# Patient Record
Sex: MALE | Race: WHITE | NOT HISPANIC OR LATINO | URBAN - METROPOLITAN AREA
[De-identification: names, ages, dates, MRNs, and addresses within clinical notes are randomized per-mention and may not be internally consistent; named-entity substitution may affect disease eponyms.]

---

## 2018-01-30 ENCOUNTER — OUTPATIENT (OUTPATIENT)
Dept: OUTPATIENT SERVICES | Facility: HOSPITAL | Age: 68
LOS: 1 days | End: 2018-01-30
Payer: COMMERCIAL

## 2018-01-30 VITALS
HEART RATE: 59 BPM | DIASTOLIC BLOOD PRESSURE: 82 MMHG | WEIGHT: 199.96 LBS | HEIGHT: 66 IN | RESPIRATION RATE: 14 BRPM | SYSTOLIC BLOOD PRESSURE: 136 MMHG | TEMPERATURE: 98 F

## 2018-01-30 DIAGNOSIS — M17.12 UNILATERAL PRIMARY OSTEOARTHRITIS, LEFT KNEE: ICD-10-CM

## 2018-01-30 DIAGNOSIS — Z98.890 OTHER SPECIFIED POSTPROCEDURAL STATES: Chronic | ICD-10-CM

## 2018-01-30 DIAGNOSIS — Z01.818 ENCOUNTER FOR OTHER PREPROCEDURAL EXAMINATION: ICD-10-CM

## 2018-01-30 LAB
ALBUMIN SERPL ELPH-MCNC: 4 G/DL — SIGNIFICANT CHANGE UP (ref 3.3–5)
ALP SERPL-CCNC: 78 U/L — SIGNIFICANT CHANGE UP (ref 40–120)
ALT FLD-CCNC: 45 U/L — SIGNIFICANT CHANGE UP (ref 12–78)
ANION GAP SERPL CALC-SCNC: 5 MMOL/L — SIGNIFICANT CHANGE UP (ref 5–17)
APPEARANCE UR: CLEAR — SIGNIFICANT CHANGE UP
APTT BLD: 28.9 SEC — SIGNIFICANT CHANGE UP (ref 27.5–37.4)
AST SERPL-CCNC: 33 U/L — SIGNIFICANT CHANGE UP (ref 15–37)
BILIRUB SERPL-MCNC: 1.2 MG/DL — SIGNIFICANT CHANGE UP (ref 0.2–1.2)
BILIRUB UR-MCNC: NEGATIVE — SIGNIFICANT CHANGE UP
BUN SERPL-MCNC: 14 MG/DL — SIGNIFICANT CHANGE UP (ref 7–23)
CALCIUM SERPL-MCNC: 9.2 MG/DL — SIGNIFICANT CHANGE UP (ref 8.5–10.1)
CHLORIDE SERPL-SCNC: 106 MMOL/L — SIGNIFICANT CHANGE UP (ref 96–108)
CO2 SERPL-SCNC: 29 MMOL/L — SIGNIFICANT CHANGE UP (ref 22–31)
COLOR SPEC: YELLOW — SIGNIFICANT CHANGE UP
CREAT SERPL-MCNC: 1.4 MG/DL — HIGH (ref 0.5–1.3)
DIFF PNL FLD: NEGATIVE — SIGNIFICANT CHANGE UP
GLUCOSE SERPL-MCNC: 93 MG/DL — SIGNIFICANT CHANGE UP (ref 70–99)
GLUCOSE UR QL: NEGATIVE — SIGNIFICANT CHANGE UP
HCT VFR BLD CALC: 41.8 % — SIGNIFICANT CHANGE UP (ref 39–50)
HCV AB S/CO SERPL IA: 0.05 S/CO — SIGNIFICANT CHANGE UP
HCV AB SERPL-IMP: SIGNIFICANT CHANGE UP
HGB BLD-MCNC: 13.9 G/DL — SIGNIFICANT CHANGE UP (ref 13–17)
HIV 1+2 AB+HIV1 P24 AG SERPL QL IA: SIGNIFICANT CHANGE UP
INR BLD: 1 RATIO — SIGNIFICANT CHANGE UP (ref 0.88–1.16)
KETONES UR-MCNC: NEGATIVE — SIGNIFICANT CHANGE UP
LEUKOCYTE ESTERASE UR-ACNC: NEGATIVE — SIGNIFICANT CHANGE UP
MCHC RBC-ENTMCNC: 31.9 PG — SIGNIFICANT CHANGE UP (ref 27–34)
MCHC RBC-ENTMCNC: 33.3 GM/DL — SIGNIFICANT CHANGE UP (ref 32–36)
MCV RBC AUTO: 95.8 FL — SIGNIFICANT CHANGE UP (ref 80–100)
NITRITE UR-MCNC: NEGATIVE — SIGNIFICANT CHANGE UP
PH UR: 5 — SIGNIFICANT CHANGE UP (ref 5–8)
PLATELET # BLD AUTO: 297 K/UL — SIGNIFICANT CHANGE UP (ref 150–400)
POTASSIUM SERPL-MCNC: 4.1 MMOL/L — SIGNIFICANT CHANGE UP (ref 3.5–5.3)
POTASSIUM SERPL-SCNC: 4.1 MMOL/L — SIGNIFICANT CHANGE UP (ref 3.5–5.3)
PROT SERPL-MCNC: 7 G/DL — SIGNIFICANT CHANGE UP (ref 6–8.3)
PROT UR-MCNC: NEGATIVE — SIGNIFICANT CHANGE UP
PROTHROM AB SERPL-ACNC: 10.9 SEC — SIGNIFICANT CHANGE UP (ref 9.8–12.7)
RBC # BLD: 4.36 M/UL — SIGNIFICANT CHANGE UP (ref 4.2–5.8)
RBC # FLD: 14.5 % — SIGNIFICANT CHANGE UP (ref 10.3–14.5)
SODIUM SERPL-SCNC: 140 MMOL/L — SIGNIFICANT CHANGE UP (ref 135–145)
SP GR SPEC: 1.02 — SIGNIFICANT CHANGE UP (ref 1.01–1.02)
UROBILINOGEN FLD QL: NEGATIVE — SIGNIFICANT CHANGE UP
WBC # BLD: 5.6 K/UL — SIGNIFICANT CHANGE UP (ref 3.8–10.5)
WBC # FLD AUTO: 5.6 K/UL — SIGNIFICANT CHANGE UP (ref 3.8–10.5)

## 2018-01-30 PROCEDURE — 71046 X-RAY EXAM CHEST 2 VIEWS: CPT | Mod: 26

## 2018-01-30 PROCEDURE — 80053 COMPREHEN METABOLIC PANEL: CPT

## 2018-01-30 PROCEDURE — 93010 ELECTROCARDIOGRAM REPORT: CPT | Mod: NC

## 2018-01-30 PROCEDURE — 87640 STAPH A DNA AMP PROBE: CPT

## 2018-01-30 PROCEDURE — 86803 HEPATITIS C AB TEST: CPT

## 2018-01-30 PROCEDURE — 93005 ELECTROCARDIOGRAM TRACING: CPT

## 2018-01-30 PROCEDURE — 81003 URINALYSIS AUTO W/O SCOPE: CPT

## 2018-01-30 PROCEDURE — 86850 RBC ANTIBODY SCREEN: CPT

## 2018-01-30 PROCEDURE — 87389 HIV-1 AG W/HIV-1&-2 AB AG IA: CPT

## 2018-01-30 PROCEDURE — 71046 X-RAY EXAM CHEST 2 VIEWS: CPT

## 2018-01-30 PROCEDURE — 85027 COMPLETE CBC AUTOMATED: CPT

## 2018-01-30 PROCEDURE — 86900 BLOOD TYPING SEROLOGIC ABO: CPT

## 2018-01-30 PROCEDURE — 87641 MR-STAPH DNA AMP PROBE: CPT

## 2018-01-30 PROCEDURE — 86901 BLOOD TYPING SEROLOGIC RH(D): CPT

## 2018-01-30 PROCEDURE — 85730 THROMBOPLASTIN TIME PARTIAL: CPT

## 2018-01-30 PROCEDURE — 87086 URINE CULTURE/COLONY COUNT: CPT

## 2018-01-30 PROCEDURE — 85610 PROTHROMBIN TIME: CPT

## 2018-01-30 PROCEDURE — G0463: CPT

## 2018-01-30 NOTE — H&P PST ADULT - HISTORY OF PRESENT ILLNESS
67 year old male presents for PST prior to LEFT Total Knee Replacement with Dr Unruly Figueroa on 2/13/18 - pt notes his LEFT Knee has been bothering him for the past 2 years now (notes prior knee arthroscopy left knee back in 1984) - pt notes knee was good until last few years when he started developing left knee pain - has not had any Physical Therapy - has had one Cortisone injection with little relief noted - noted decreased ROM and strength - also notes had series of Gel Injections (X5) - pt notes xrays show bone on bone rates pain #8/10 on pain scale - Uses Aleve as needed for pain -   following discussions with Dr Figueroa pt is electing for scheduled procedure.

## 2018-01-30 NOTE — H&P PST ADULT - PMH
Afib  S/P Ablation in 2013  BPH (benign prostatic hypertrophy)    Dyslipidemia    Hypertension    Unilateral primary osteoarthritis, left knee Afib  S/P Ablation in 2013  BPH (benign prostatic hypertrophy)    Dyslipidemia    Hypertension    Migraine    Unilateral primary osteoarthritis, left knee

## 2018-01-30 NOTE — H&P PST ADULT - PSH
Elbow joint pain  ligament repair  H/O colonoscopy  Age 52  History of arthroscopy of knee  left  Trigger finger  repair left hand  Ventral hernia  repair with mesh

## 2018-01-30 NOTE — H&P PST ADULT - ASSESSMENT
67 year old male with Unilateral Primary Osteoarthritis; LEFT KNEE - scheduled for LEFT Total Knee Replacement with Dr Unruly Figueroa on 2/13/18 -

## 2018-01-30 NOTE — H&P PST ADULT - MUSCULOSKELETAL
details… detailed exam decreased ROM/decreased ROM due to pain/LEFT Knee Pain - decreased ROM/Strength/no calf tenderness/diminished strength

## 2018-01-30 NOTE — H&P PST ADULT - NSANTHOSAYNRD_GEN_A_CORE
No. EULALIA screening performed.  STOP BANG Legend: 0-2 = LOW Risk; 3-4 = INTERMEDIATE Risk; 5-8 = HIGH Risk

## 2018-01-30 NOTE — H&P PST ADULT - FAMILY HISTORY
Mother  Still living? No  Family history of cancer, Age at diagnosis: Age Unknown     Father  Still living? No  Family history of lung cancer, Age at diagnosis: Age Unknown     Sibling  Still living? No  Family history of pancreatic cancer, Age at diagnosis: Age Unknown

## 2018-01-31 LAB
CULTURE RESULTS: NO GROWTH — SIGNIFICANT CHANGE UP
MRSA PCR RESULT.: SIGNIFICANT CHANGE UP
S AUREUS DNA NOSE QL NAA+PROBE: SIGNIFICANT CHANGE UP
SPECIMEN SOURCE: SIGNIFICANT CHANGE UP

## 2018-02-12 RX ORDER — SODIUM CHLORIDE 9 MG/ML
1000 INJECTION, SOLUTION INTRAVENOUS
Qty: 0 | Refills: 0 | Status: DISCONTINUED | OUTPATIENT
Start: 2018-02-13 | End: 2018-02-13

## 2018-02-13 ENCOUNTER — TRANSCRIPTION ENCOUNTER (OUTPATIENT)
Age: 68
End: 2018-02-13

## 2018-02-13 ENCOUNTER — INPATIENT (INPATIENT)
Facility: HOSPITAL | Age: 68
LOS: 2 days | Discharge: HOME | DRG: 470 | End: 2018-02-16
Attending: ORTHOPAEDIC SURGERY | Admitting: ORTHOPAEDIC SURGERY
Payer: COMMERCIAL

## 2018-02-13 ENCOUNTER — RESULT REVIEW (OUTPATIENT)
Age: 68
End: 2018-02-13

## 2018-02-13 VITALS
DIASTOLIC BLOOD PRESSURE: 92 MMHG | OXYGEN SATURATION: 97 % | SYSTOLIC BLOOD PRESSURE: 151 MMHG | HEART RATE: 67 BPM | RESPIRATION RATE: 16 BRPM | TEMPERATURE: 99 F

## 2018-02-13 DIAGNOSIS — M17.12 UNILATERAL PRIMARY OSTEOARTHRITIS, LEFT KNEE: ICD-10-CM

## 2018-02-13 DIAGNOSIS — Z29.9 ENCOUNTER FOR PROPHYLACTIC MEASURES, UNSPECIFIED: ICD-10-CM

## 2018-02-13 DIAGNOSIS — Z98.890 OTHER SPECIFIED POSTPROCEDURAL STATES: Chronic | ICD-10-CM

## 2018-02-13 DIAGNOSIS — R52 PAIN, UNSPECIFIED: ICD-10-CM

## 2018-02-13 LAB
ABO RH CONFIRMATION: SIGNIFICANT CHANGE UP
ANION GAP SERPL CALC-SCNC: 6 MMOL/L — SIGNIFICANT CHANGE UP (ref 5–17)
BUN SERPL-MCNC: 19 MG/DL — SIGNIFICANT CHANGE UP (ref 7–23)
CALCIUM SERPL-MCNC: 8.7 MG/DL — SIGNIFICANT CHANGE UP (ref 8.5–10.1)
CHLORIDE SERPL-SCNC: 104 MMOL/L — SIGNIFICANT CHANGE UP (ref 96–108)
CO2 SERPL-SCNC: 27 MMOL/L — SIGNIFICANT CHANGE UP (ref 22–31)
CREAT SERPL-MCNC: 1.6 MG/DL — HIGH (ref 0.5–1.3)
GLUCOSE SERPL-MCNC: 142 MG/DL — HIGH (ref 70–99)
HCT VFR BLD CALC: 38.8 % — LOW (ref 39–50)
HGB BLD-MCNC: 13 G/DL — SIGNIFICANT CHANGE UP (ref 13–17)
MCHC RBC-ENTMCNC: 31.6 PG — SIGNIFICANT CHANGE UP (ref 27–34)
MCHC RBC-ENTMCNC: 33.5 GM/DL — SIGNIFICANT CHANGE UP (ref 32–36)
MCV RBC AUTO: 94.3 FL — SIGNIFICANT CHANGE UP (ref 80–100)
PLATELET # BLD AUTO: 235 K/UL — SIGNIFICANT CHANGE UP (ref 150–400)
POTASSIUM SERPL-MCNC: 4.2 MMOL/L — SIGNIFICANT CHANGE UP (ref 3.5–5.3)
POTASSIUM SERPL-SCNC: 4.2 MMOL/L — SIGNIFICANT CHANGE UP (ref 3.5–5.3)
RBC # BLD: 4.12 M/UL — LOW (ref 4.2–5.8)
RBC # FLD: 13.5 % — SIGNIFICANT CHANGE UP (ref 10.3–14.5)
SODIUM SERPL-SCNC: 137 MMOL/L — SIGNIFICANT CHANGE UP (ref 135–145)
WBC # BLD: 7.6 K/UL — SIGNIFICANT CHANGE UP (ref 3.8–10.5)
WBC # FLD AUTO: 7.6 K/UL — SIGNIFICANT CHANGE UP (ref 3.8–10.5)

## 2018-02-13 PROCEDURE — 88305 TISSUE EXAM BY PATHOLOGIST: CPT | Mod: 26

## 2018-02-13 PROCEDURE — 73562 X-RAY EXAM OF KNEE 3: CPT | Mod: 26,LT

## 2018-02-13 PROCEDURE — 88311 DECALCIFY TISSUE: CPT | Mod: 26

## 2018-02-13 RX ORDER — ONDANSETRON 8 MG/1
4 TABLET, FILM COATED ORAL EVERY 6 HOURS
Qty: 0 | Refills: 0 | Status: DISCONTINUED | OUTPATIENT
Start: 2018-02-13 | End: 2018-02-16

## 2018-02-13 RX ORDER — SODIUM CHLORIDE 9 MG/ML
3 INJECTION INTRAMUSCULAR; INTRAVENOUS; SUBCUTANEOUS EVERY 8 HOURS
Qty: 0 | Refills: 0 | Status: DISCONTINUED | OUTPATIENT
Start: 2018-02-13 | End: 2018-02-16

## 2018-02-13 RX ORDER — POLYETHYLENE GLYCOL 3350 17 G/17G
17 POWDER, FOR SOLUTION ORAL DAILY
Qty: 0 | Refills: 0 | Status: DISCONTINUED | OUTPATIENT
Start: 2018-02-13 | End: 2018-02-16

## 2018-02-13 RX ORDER — ATORVASTATIN CALCIUM 80 MG/1
20 TABLET, FILM COATED ORAL AT BEDTIME
Qty: 0 | Refills: 0 | Status: DISCONTINUED | OUTPATIENT
Start: 2018-02-13 | End: 2018-02-16

## 2018-02-13 RX ORDER — FOLIC ACID 0.8 MG
1 TABLET ORAL DAILY
Qty: 0 | Refills: 0 | Status: DISCONTINUED | OUTPATIENT
Start: 2018-02-13 | End: 2018-02-16

## 2018-02-13 RX ORDER — ASCORBIC ACID 60 MG
500 TABLET,CHEWABLE ORAL
Qty: 0 | Refills: 0 | Status: DISCONTINUED | OUTPATIENT
Start: 2018-02-13 | End: 2018-02-16

## 2018-02-13 RX ORDER — OXYCODONE HYDROCHLORIDE 5 MG/1
5 TABLET ORAL EVERY 4 HOURS
Qty: 0 | Refills: 0 | Status: DISCONTINUED | OUTPATIENT
Start: 2018-02-13 | End: 2018-02-16

## 2018-02-13 RX ORDER — SUMATRIPTAN SUCCINATE 4 MG/.5ML
100 INJECTION, SOLUTION SUBCUTANEOUS DAILY
Qty: 0 | Refills: 0 | Status: DISCONTINUED | OUTPATIENT
Start: 2018-02-13 | End: 2018-02-16

## 2018-02-13 RX ORDER — PANTOPRAZOLE SODIUM 20 MG/1
40 TABLET, DELAYED RELEASE ORAL DAILY
Qty: 0 | Refills: 0 | Status: DISCONTINUED | OUTPATIENT
Start: 2018-02-13 | End: 2018-02-16

## 2018-02-13 RX ORDER — TAMSULOSIN HYDROCHLORIDE 0.4 MG/1
0.4 CAPSULE ORAL AT BEDTIME
Qty: 0 | Refills: 0 | Status: DISCONTINUED | OUTPATIENT
Start: 2018-02-13 | End: 2018-02-16

## 2018-02-13 RX ORDER — BENZOCAINE AND MENTHOL 5; 1 G/100ML; G/100ML
1 LIQUID ORAL
Qty: 0 | Refills: 0 | Status: DISCONTINUED | OUTPATIENT
Start: 2018-02-13 | End: 2018-02-16

## 2018-02-13 RX ORDER — ACETAMINOPHEN 500 MG
1000 TABLET ORAL ONCE
Qty: 0 | Refills: 0 | Status: COMPLETED | OUTPATIENT
Start: 2018-02-13 | End: 2018-02-13

## 2018-02-13 RX ORDER — SENNA PLUS 8.6 MG/1
2 TABLET ORAL AT BEDTIME
Qty: 0 | Refills: 0 | Status: DISCONTINUED | OUTPATIENT
Start: 2018-02-13 | End: 2018-02-16

## 2018-02-13 RX ORDER — FERROUS SULFATE 325(65) MG
325 TABLET ORAL
Qty: 0 | Refills: 0 | Status: DISCONTINUED | OUTPATIENT
Start: 2018-02-13 | End: 2018-02-16

## 2018-02-13 RX ORDER — CHOLECALCIFEROL (VITAMIN D3) 125 MCG
1000 CAPSULE ORAL AT BEDTIME
Qty: 0 | Refills: 0 | Status: DISCONTINUED | OUTPATIENT
Start: 2018-02-13 | End: 2018-02-16

## 2018-02-13 RX ORDER — ENOXAPARIN SODIUM 100 MG/ML
30 INJECTION SUBCUTANEOUS EVERY 12 HOURS
Qty: 0 | Refills: 0 | Status: DISCONTINUED | OUTPATIENT
Start: 2018-02-14 | End: 2018-02-16

## 2018-02-13 RX ORDER — ACETAMINOPHEN 500 MG
650 TABLET ORAL EVERY 6 HOURS
Qty: 0 | Refills: 0 | Status: DISCONTINUED | OUTPATIENT
Start: 2018-02-13 | End: 2018-02-16

## 2018-02-13 RX ORDER — DIPHENHYDRAMINE HCL 50 MG
25 CAPSULE ORAL EVERY 4 HOURS
Qty: 0 | Refills: 0 | Status: DISCONTINUED | OUTPATIENT
Start: 2018-02-13 | End: 2018-02-16

## 2018-02-13 RX ORDER — SODIUM CHLORIDE 9 MG/ML
1000 INJECTION, SOLUTION INTRAVENOUS
Qty: 0 | Refills: 0 | Status: DISCONTINUED | OUTPATIENT
Start: 2018-02-13 | End: 2018-02-14

## 2018-02-13 RX ORDER — DOCUSATE SODIUM 100 MG
100 CAPSULE ORAL THREE TIMES A DAY
Qty: 0 | Refills: 0 | Status: DISCONTINUED | OUTPATIENT
Start: 2018-02-13 | End: 2018-02-16

## 2018-02-13 RX ORDER — ASPIRIN/CALCIUM CARB/MAGNESIUM 324 MG
1 TABLET ORAL
Qty: 60 | Refills: 0
Start: 2018-02-13 | End: 2018-03-14

## 2018-02-13 RX ORDER — BUPIVACAINE HCL/PF 7.5 MG/ML
400 VIAL (ML) INJECTION
Qty: 0 | Refills: 0 | Status: DISCONTINUED | OUTPATIENT
Start: 2018-02-13 | End: 2018-02-16

## 2018-02-13 RX ORDER — OXYCODONE HYDROCHLORIDE 5 MG/1
10 TABLET ORAL EVERY 4 HOURS
Qty: 0 | Refills: 0 | Status: DISCONTINUED | OUTPATIENT
Start: 2018-02-13 | End: 2018-02-16

## 2018-02-13 RX ORDER — CEFAZOLIN SODIUM 1 G
2000 VIAL (EA) INJECTION EVERY 8 HOURS
Qty: 0 | Refills: 0 | Status: COMPLETED | OUTPATIENT
Start: 2018-02-13 | End: 2018-02-14

## 2018-02-13 RX ORDER — DIPHENHYDRAMINE HCL 50 MG
25 CAPSULE ORAL AT BEDTIME
Qty: 0 | Refills: 0 | Status: DISCONTINUED | OUTPATIENT
Start: 2018-02-13 | End: 2018-02-16

## 2018-02-13 RX ORDER — ATENOLOL 25 MG/1
50 TABLET ORAL DAILY
Qty: 0 | Refills: 0 | Status: DISCONTINUED | OUTPATIENT
Start: 2018-02-13 | End: 2018-02-16

## 2018-02-13 RX ORDER — SUMATRIPTAN SUCCINATE 4 MG/.5ML
100 INJECTION, SOLUTION SUBCUTANEOUS DAILY
Qty: 0 | Refills: 0 | Status: DISCONTINUED | OUTPATIENT
Start: 2018-02-13 | End: 2018-02-13

## 2018-02-13 RX ORDER — BUPIVACAINE 13.3 MG/ML
20 INJECTION, SUSPENSION, LIPOSOMAL INFILTRATION ONCE
Qty: 0 | Refills: 0 | Status: DISCONTINUED | OUTPATIENT
Start: 2018-02-13 | End: 2018-02-13

## 2018-02-13 RX ORDER — CEFAZOLIN SODIUM 1 G
2000 VIAL (EA) INJECTION ONCE
Qty: 0 | Refills: 0 | Status: COMPLETED | OUTPATIENT
Start: 2018-02-13 | End: 2018-02-13

## 2018-02-13 RX ORDER — HYDROMORPHONE HYDROCHLORIDE 2 MG/ML
0.5 INJECTION INTRAMUSCULAR; INTRAVENOUS; SUBCUTANEOUS EVERY 4 HOURS
Qty: 0 | Refills: 0 | Status: DISCONTINUED | OUTPATIENT
Start: 2018-02-13 | End: 2018-02-16

## 2018-02-13 RX ORDER — OXYCODONE AND ACETAMINOPHEN 5; 325 MG/1; MG/1
1 TABLET ORAL
Qty: 42 | Refills: 0
Start: 2018-02-13

## 2018-02-13 RX ORDER — MAGNESIUM HYDROXIDE 400 MG/1
30 TABLET, CHEWABLE ORAL DAILY
Qty: 0 | Refills: 0 | Status: DISCONTINUED | OUTPATIENT
Start: 2018-02-13 | End: 2018-02-16

## 2018-02-13 RX ORDER — FINASTERIDE 5 MG/1
5 TABLET, FILM COATED ORAL DAILY
Qty: 0 | Refills: 0 | Status: DISCONTINUED | OUTPATIENT
Start: 2018-02-13 | End: 2018-02-16

## 2018-02-13 RX ADMIN — Medication 100 MILLIGRAM(S): at 16:07

## 2018-02-13 RX ADMIN — ONDANSETRON 4 MILLIGRAM(S): 8 TABLET, FILM COATED ORAL at 17:29

## 2018-02-13 RX ADMIN — Medication 100 MILLIGRAM(S): at 16:35

## 2018-02-13 RX ADMIN — TAMSULOSIN HYDROCHLORIDE 0.4 MILLIGRAM(S): 0.4 CAPSULE ORAL at 21:30

## 2018-02-13 RX ADMIN — Medication 100 MILLIGRAM(S): at 21:30

## 2018-02-13 RX ADMIN — Medication 4 MILLILITER(S): at 13:49

## 2018-02-13 RX ADMIN — SODIUM CHLORIDE 75 MILLILITER(S): 9 INJECTION, SOLUTION INTRAVENOUS at 13:51

## 2018-02-13 RX ADMIN — Medication 1 TABLET(S): at 16:33

## 2018-02-13 RX ADMIN — PANTOPRAZOLE SODIUM 40 MILLIGRAM(S): 20 TABLET, DELAYED RELEASE ORAL at 16:07

## 2018-02-13 RX ADMIN — Medication 325 MILLIGRAM(S): at 16:15

## 2018-02-13 RX ADMIN — Medication 1000 UNIT(S): at 21:31

## 2018-02-13 RX ADMIN — SODIUM CHLORIDE 75 MILLILITER(S): 9 INJECTION, SOLUTION INTRAVENOUS at 09:20

## 2018-02-13 RX ADMIN — Medication 1 MILLIGRAM(S): at 16:33

## 2018-02-13 RX ADMIN — Medication 500 MILLIGRAM(S): at 16:15

## 2018-02-13 RX ADMIN — POLYETHYLENE GLYCOL 3350 17 GRAM(S): 17 POWDER, FOR SOLUTION ORAL at 16:33

## 2018-02-13 RX ADMIN — SODIUM CHLORIDE 3 MILLILITER(S): 9 INJECTION INTRAMUSCULAR; INTRAVENOUS; SUBCUTANEOUS at 21:30

## 2018-02-13 RX ADMIN — ATENOLOL 50 MILLIGRAM(S): 25 TABLET ORAL at 16:11

## 2018-02-13 RX ADMIN — ATORVASTATIN CALCIUM 20 MILLIGRAM(S): 80 TABLET, FILM COATED ORAL at 21:30

## 2018-02-13 NOTE — PHYSICAL THERAPY INITIAL EVALUATION ADULT - GAIT TRAINING, PT EVAL
Pt will be supervision level of assistance for ambulation with appropriate assistive device for 100 feet in 3-5 sessions

## 2018-02-13 NOTE — PHYSICAL THERAPY INITIAL EVALUATION ADULT - ADDITIONAL COMMENTS
Pt reports that he lives in a private home, 2 steps to enter, with spouse. Pt independent with ambulation, ADLs.

## 2018-02-13 NOTE — DISCHARGE NOTE ADULT - MEDICATION SUMMARY - MEDICATIONS TO TAKE
I will START or STAY ON the medications listed below when I get home from the hospital:    finasteride 5 mg oral tablet  -- 1 tab(s) by mouth once a day (at bedtime)  -- Indication: For Per PMD    Ecotrin 325 mg oral delayed release tablet  -- 1 tab(s) by mouth 2 times a day   -- Swallow whole.  Do not crush.  Take with food or milk.    -- Indication: For DVT ppx    Percocet 5/325 oral tablet  -- 1 tab(s) by mouth every 4 hours, As Needed for pain MDD:6  -- Caution federal law prohibits the transfer of this drug to any person other  than the person for whom it was prescribed.  May cause drowsiness.  Alcohol may intensify this effect.  Use care when operating dangerous machinery.  This prescription cannot be refilled.  This product contains acetaminophen.  Do not use  with any other product containing acetaminophen to prevent possible liver damage.  Using more of this medication than prescribed may cause serious breathing problems.    -- Indication: For Pain    SUMAtriptan 100 mg oral tablet  -- 1 tab(s) by mouth prn- HEADACHE  -- Indication: For Per PMD    rizatriptan 10 mg oral tablet  -- 1 tab(s) by mouth prn- HEADACHE  -- Indication: For Per PMD    tamsulosin 0.4 mg oral capsule  -- 1 cap(s) by mouth once a day (at bedtime)  -- Indication: For Per PMD    atorvastatin 20 mg oral tablet  -- 1 tab(s) by mouth once a day (at bedtime)  -- Indication: For Per PMD    methotrexate 2.5 mg oral tablet  -- 4  by mouth once a week- SATURDAYS  -- Indication: For Per PMD    atenolol 50 mg oral tablet  -- 1 tab(s) by mouth once a day- IN AM  -- Indication: For Per PMD    Saw Palmetto oral capsule  -- 1 cap(s) by mouth once a day (at bedtime)  -- Indication: For Per PMD    omeprazole 20 mg oral delayed release capsule  -- 1 cap(s) by mouth once a day (at bedtime)  -- Indication: For Per PMD    folic acid 1 mg oral tablet  -- 1 tab(s) by mouth once a day (at bedtime)  -- Indication: For Per PMD    Vitamin D3 2000 intl units oral capsule  -- 1 cap(s) by mouth once a day (at bedtime)  -- Indication: For Per PMD

## 2018-02-13 NOTE — PATIENT PROFILE ADULT. - PMH
Afib  S/P Ablation in 2013  BPH (benign prostatic hypertrophy)    Dyslipidemia    Hypertension    Migraine    Unilateral primary osteoarthritis, left knee

## 2018-02-13 NOTE — DISCHARGE NOTE ADULT - HOSPITAL COURSE
The patient is a 67 year old male status post elective total knee Arthroplasty to the left knee after failing outpatient nonoperative conservative management. Patient presented to [facility] after being medically cleared for an elective surgical procedure. The patient was taken to the operating room on date mentioned above. Prophylactic antibiotics were started before the procedure and continued for 24 hours. There were no complications during the procedure and patient tolerated the procedure well. The patient was transferred to the recovery room in stable condition and subsequently to the surgical floor. The patient was placed on Aspirin for anticoagulation. All home medications were continued. The patient received physical therapy daily and daily labs were followed. The dressing was kept clean, dry, intact, and was changed appropriately. The rest of the hopital stay was unremarkable.

## 2018-02-13 NOTE — DISCHARGE NOTE ADULT - CARE PROVIDER_API CALL
Unruly Figueroa), Orthopaedic Surgery  [address] [place] [address]  Phone: [phone]  Fax: [phone]

## 2018-02-13 NOTE — DISCHARGE NOTE ADULT - CARE PLAN
Principal Discharge DX:	Unilateral primary osteoarthritis, left knee  Goal:	Return to Baseline ADLs s/p L TKA  Assessment and plan of treatment:	1. Pain control  2. Walking with full weight bearing as tolerated, with assistive devices (walker/cane) as needed  3. DVT Prophylaxis for 30 days with Aspirin  4. Physical therapy  5. Follow up with Dr. Figueroa as outpatient in 10-14 days after discharge from the hospital or rehab. Call office for appointment [phone].  6. Remove staples/sutures Post-Op Day 14, and remove dressing Post-Op Day 10 with daily dressing changes as needed.  7. Ice affected area as needed.  8. Keep dressing clean and dry.

## 2018-02-13 NOTE — PHYSICAL THERAPY INITIAL EVALUATION ADULT - GENERAL OBSERVATIONS, REHAB EVAL
Pt received semi-olsen in bed, no apparent distress, call bell in reach, hemo-vac in place, on q-pump, B SCDs. Wife present

## 2018-02-13 NOTE — DISCHARGE NOTE ADULT - PLAN OF CARE
Return to Baseline ADLs s/p L TKA 1. Pain control  2. Walking with full weight bearing as tolerated, with assistive devices (walker/cane) as needed  3. DVT Prophylaxis for 30 days with Aspirin  4. Physical therapy  5. Follow up with Dr. Figueroa as outpatient in 10-14 days after discharge from the hospital or rehab. Call office for appointment [phone].  6. Remove staples/sutures Post-Op Day 14, and remove dressing Post-Op Day 10 with daily dressing changes as needed.  7. Ice affected area as needed.  8. Keep dressing clean and dry.

## 2018-02-13 NOTE — BRIEF OPERATIVE NOTE - PROCEDURE
<<-----Click on this checkbox to enter Procedure Total knee arthroplasty  02/13/2018    Active  CAROL

## 2018-02-13 NOTE — CONSULT NOTE ADULT - SUBJECTIVE AND OBJECTIVE BOX
67 year old male presented to PST prior to LEFT Total Knee Replacement with Dr Unruly Figueroa on 2/13/18 - pt notes his LEFT Knee has been bothering him for the past 2 years now (notes prior knee arthroscopy left knee back in 1984) - pt notes knee was good until last few years when he started developing left knee pain - has not had any Physical Therapy - has had one Cortisone injection with little relief noted - noted decreased ROM and strength - also notes had series of Gel Injections (X5) - pt notes xrays show bone on bone rates pain #8/10 on pain scale - Uses Aleve as needed for pain.    Allergies/Medications:   Allergies:        Allergies:  	No Known Allergies:     Home Medications:   * Patient Currently Takes Medications as of 30-Jan-2018 13:00 documented in Structured Notes  · 	tamsulosin 0.4 mg oral capsule: Last Dose Taken:  , 1 cap(s) orally once a day (at bedtime)  · 	finasteride 5 mg oral tablet: Last Dose Taken:  , 1 tab(s) orally once a day (at bedtime)  · 	methotrexate 2.5 mg oral tablet: Last Dose Taken:  , 4  orally once a week- SATURDAYS  · 	folic acid 1 mg oral tablet: Last Dose Taken:  , 1 tab(s) orally once a day (at bedtime)  · 	atorvastatin 20 mg oral tablet: Last Dose Taken:  , 1 tab(s) orally once a day (at bedtime)  · 	atenolol 50 mg oral tablet: Last Dose Taken:  , 1 tab(s) orally once a day- IN AM  · 	SUMAtriptan 100 mg oral tablet: 1 tab(s) orally prn- HEADACHE  · 	rizatriptan 10 mg oral tablet: 1 tab(s) orally prn- HEADACHE  · 	omeprazole 20 mg oral delayed release capsule: 1 cap(s) orally once a day (at bedtime)  · 	Saw Palmetto oral capsule: 1 cap(s) orally once a day (at bedtime)  · 	Vitamin D3 2000 intl units oral capsule: 1 cap(s) orally once a day (at bedtime)PMH/PSH/FH/SH:   Past Medical History:  Afib  S/P Ablation in 2013  BPH (benign prostatic hypertrophy)    Dyslipidemia    Hypertension    Migraine    Unilateral primary osteoarthritis, left knee.    Past Surgical History:  Elbow joint pain  ligament repair  H/O colonoscopy  Age 52  History of arthroscopy of knee  left  Trigger finger  repair left hand  Ventral hernia  repair with mesh.Family History:  Mother  Still living? No  Family history of cancer, Age at diagnosis: Age Unknown     Father  Still living? No  Family history of lung cancer, Age at diagnosis: Age Unknown     Sibling  Still living? No  Family history of pancreatic cancer, Age at diagnosis: Age Unknown.    Social History:  · Marital Status		  · Occupation	Retired - worked for [employer]	  · Lives With	spouse; Wife - Radha	    Substance Use History:  · Substance Use	caffeine  Denies Illicit Drug Use	  · Caffeine Type	tea	  · Caffeine Amount/Frequency	1-2 cups/cans per day	    Alcohol Use History:  · Have you ever consumed alcohol	yes...	  · Alcohol Use Comment	Denies any alcohol intake	  Tobacco Usage:  · Tobacco Usage: Never smoker	  Review of Systems:   · Negative General Symptoms	no fever; no chills; no sweating	  · Negative Skin Symptoms	no rash; no itching; no dryness	  · Ophthalmologic Comments	Wears RX Eyeglasses	  · Negative ENMT Symptoms	no hearing difficulty; no sinus symptoms; no nasal congestion	  · Negative Respiratory and Thorax Symptoms	no wheezing; no dyspnea; no cough	  · Negative Cardiovascular Symptoms	no chest pain; no palpitations; no dyspnea on exertion	  · Negative Gastrointestinal Symptoms	no nausea; no vomiting; no diarrhea; no constipation	  · Negative General Genitourinary Symptoms	no hematuria; no dysuria; no urinary hesitancy; normal urinary frequency	  · Genitourinary Comments	Denies any urinary Symptoms "as long as I keep taking my Saw Palmetto"	  · Musculoskeletal Symptoms	arthritis	  · Musculoskeletal Comments	LEFT knee Pain - SEE HPI	  · Neurological	negative	  · Negative Psychiatric Symptoms	no depression; no anxiety	  · Hematology/Lymphatics	negative	  · Endocrine	negative	  · Allergic/Immunologic	negative	  Physical Exam:  · Constitutional	detailed exam	  · Constitutional Details	well-developed; well-groomed; well-nourished; no distress	  · Eyes	detailed exam	  · Eyes Details	PERRL; EOMI	  · ENMT	detailed exam	  · ENMT Details	mouth; pharynx	  · Mouth	moist	  · Pharynx	no redness; no discharge	  · Neck	detailed exam	  · Neck Details	supple; no JVD	  · Breasts	not examined	  · Back	not examined	  · Respiratory	detailed exam	  · Respiratory Details	airway patent; breath sounds equal; good air movement; respirations non-labored; clear to auscultation bilaterally	  · Cardiovascular	detailed exam	  · Cardiovascular Details	regular rate and rhythm	  · Cardiovascular Details	positive S1; positive S2	  · Gastrointestinal	detailed exam	  · GI Normal	soft; nontender; no distention; bowel sounds normal	  · Genitourinary	not examined	  · Rectal	not examined	  · Extremities	detailed exam	  · Extremities Details	no cyanosis; no pedal edema	  · Vascular	detailed exam	  · Radial Pulse	right normal; left normal	  · Neurological	detailed exam	  · Neurological Details	alert and oriented x 3; responds to verbal commands	  · Skin	detailed exam	  · Skin Details	warm and dry; color normal	  · Lymph Nodes	detailed exam

## 2018-02-13 NOTE — PHYSICAL THERAPY INITIAL EVALUATION ADULT - CRITERIA FOR SKILLED THERAPEUTIC INTERVENTIONS
therapy frequency/impairments found/anticipated discharge recommendation/risk reduction/prevention/rehab potential/functional limitations in following categories

## 2018-02-13 NOTE — PROGRESS NOTE ADULT - ASSESSMENT
A/P: 67M s/p L TKA POD 0  Pain Control  DVT ppx  WBAT  Monitor HMV output  PT/OT  Maintain Onq PUMP  Incentive Spirometry  Medical management appreciated  DC planning

## 2018-02-13 NOTE — DISCHARGE NOTE ADULT - PATIENT PORTAL LINK FT
You can access the [email or web address], offered by [facility], by registering with the following website: [email or web address]

## 2018-02-14 LAB
ANION GAP SERPL CALC-SCNC: 6 MMOL/L — SIGNIFICANT CHANGE UP (ref 5–17)
BUN SERPL-MCNC: 17 MG/DL — SIGNIFICANT CHANGE UP (ref 7–23)
CALCIUM SERPL-MCNC: 8.9 MG/DL — SIGNIFICANT CHANGE UP (ref 8.5–10.1)
CHLORIDE SERPL-SCNC: 101 MMOL/L — SIGNIFICANT CHANGE UP (ref 96–108)
CO2 SERPL-SCNC: 29 MMOL/L — SIGNIFICANT CHANGE UP (ref 22–31)
CREAT SERPL-MCNC: 1.6 MG/DL — HIGH (ref 0.5–1.3)
GLUCOSE SERPL-MCNC: 111 MG/DL — HIGH (ref 70–99)
HCT VFR BLD CALC: 37.6 % — LOW (ref 39–50)
HGB BLD-MCNC: 12.4 G/DL — LOW (ref 13–17)
MCHC RBC-ENTMCNC: 30.7 PG — SIGNIFICANT CHANGE UP (ref 27–34)
MCHC RBC-ENTMCNC: 33 GM/DL — SIGNIFICANT CHANGE UP (ref 32–36)
MCV RBC AUTO: 93.1 FL — SIGNIFICANT CHANGE UP (ref 80–100)
PLATELET # BLD AUTO: 294 K/UL — SIGNIFICANT CHANGE UP (ref 150–400)
POTASSIUM SERPL-MCNC: 4.1 MMOL/L — SIGNIFICANT CHANGE UP (ref 3.5–5.3)
POTASSIUM SERPL-SCNC: 4.1 MMOL/L — SIGNIFICANT CHANGE UP (ref 3.5–5.3)
RBC # BLD: 4.04 M/UL — LOW (ref 4.2–5.8)
RBC # FLD: 13.3 % — SIGNIFICANT CHANGE UP (ref 10.3–14.5)
SODIUM SERPL-SCNC: 136 MMOL/L — SIGNIFICANT CHANGE UP (ref 135–145)
WBC # BLD: 13.6 K/UL — HIGH (ref 3.8–10.5)
WBC # FLD AUTO: 13.6 K/UL — HIGH (ref 3.8–10.5)

## 2018-02-14 RX ADMIN — TAMSULOSIN HYDROCHLORIDE 0.4 MILLIGRAM(S): 0.4 CAPSULE ORAL at 22:25

## 2018-02-14 RX ADMIN — OXYCODONE HYDROCHLORIDE 5 MILLIGRAM(S): 5 TABLET ORAL at 07:53

## 2018-02-14 RX ADMIN — ATENOLOL 50 MILLIGRAM(S): 25 TABLET ORAL at 05:39

## 2018-02-14 RX ADMIN — Medication 325 MILLIGRAM(S): at 07:53

## 2018-02-14 RX ADMIN — Medication 100 MILLIGRAM(S): at 22:25

## 2018-02-14 RX ADMIN — POLYETHYLENE GLYCOL 3350 17 GRAM(S): 17 POWDER, FOR SOLUTION ORAL at 11:45

## 2018-02-14 RX ADMIN — ENOXAPARIN SODIUM 30 MILLIGRAM(S): 100 INJECTION SUBCUTANEOUS at 05:39

## 2018-02-14 RX ADMIN — Medication 1 TABLET(S): at 11:46

## 2018-02-14 RX ADMIN — Medication 650 MILLIGRAM(S): at 07:53

## 2018-02-14 RX ADMIN — OXYCODONE HYDROCHLORIDE 10 MILLIGRAM(S): 5 TABLET ORAL at 17:33

## 2018-02-14 RX ADMIN — Medication 1 MILLIGRAM(S): at 11:46

## 2018-02-14 RX ADMIN — SODIUM CHLORIDE 3 MILLILITER(S): 9 INJECTION INTRAMUSCULAR; INTRAVENOUS; SUBCUTANEOUS at 22:25

## 2018-02-14 RX ADMIN — Medication 100 MILLIGRAM(S): at 05:39

## 2018-02-14 RX ADMIN — OXYCODONE HYDROCHLORIDE 10 MILLIGRAM(S): 5 TABLET ORAL at 13:27

## 2018-02-14 RX ADMIN — Medication 325 MILLIGRAM(S): at 17:33

## 2018-02-14 RX ADMIN — ATORVASTATIN CALCIUM 20 MILLIGRAM(S): 80 TABLET, FILM COATED ORAL at 22:25

## 2018-02-14 RX ADMIN — SODIUM CHLORIDE 3 MILLILITER(S): 9 INJECTION INTRAMUSCULAR; INTRAVENOUS; SUBCUTANEOUS at 05:34

## 2018-02-14 RX ADMIN — ONDANSETRON 4 MILLIGRAM(S): 8 TABLET, FILM COATED ORAL at 17:33

## 2018-02-14 RX ADMIN — Medication 500 MILLIGRAM(S): at 05:38

## 2018-02-14 RX ADMIN — Medication 100 MILLIGRAM(S): at 13:27

## 2018-02-14 RX ADMIN — ENOXAPARIN SODIUM 30 MILLIGRAM(S): 100 INJECTION SUBCUTANEOUS at 17:33

## 2018-02-14 RX ADMIN — Medication 650 MILLIGRAM(S): at 01:00

## 2018-02-14 RX ADMIN — Medication 650 MILLIGRAM(S): at 08:17

## 2018-02-14 RX ADMIN — Medication 650 MILLIGRAM(S): at 00:39

## 2018-02-14 RX ADMIN — Medication 100 MILLIGRAM(S): at 00:37

## 2018-02-14 RX ADMIN — Medication 325 MILLIGRAM(S): at 11:46

## 2018-02-14 RX ADMIN — SODIUM CHLORIDE 3 MILLILITER(S): 9 INJECTION INTRAMUSCULAR; INTRAVENOUS; SUBCUTANEOUS at 13:25

## 2018-02-14 RX ADMIN — FINASTERIDE 5 MILLIGRAM(S): 5 TABLET, FILM COATED ORAL at 11:46

## 2018-02-14 RX ADMIN — Medication 1000 UNIT(S): at 22:25

## 2018-02-14 RX ADMIN — Medication 650 MILLIGRAM(S): at 22:24

## 2018-02-14 RX ADMIN — OXYCODONE HYDROCHLORIDE 10 MILLIGRAM(S): 5 TABLET ORAL at 17:29

## 2018-02-14 RX ADMIN — PANTOPRAZOLE SODIUM 40 MILLIGRAM(S): 20 TABLET, DELAYED RELEASE ORAL at 11:46

## 2018-02-14 RX ADMIN — Medication 650 MILLIGRAM(S): at 23:24

## 2018-02-14 RX ADMIN — OXYCODONE HYDROCHLORIDE 5 MILLIGRAM(S): 5 TABLET ORAL at 08:30

## 2018-02-14 RX ADMIN — ONDANSETRON 4 MILLIGRAM(S): 8 TABLET, FILM COATED ORAL at 00:19

## 2018-02-14 RX ADMIN — Medication 500 MILLIGRAM(S): at 17:33

## 2018-02-14 NOTE — ANESTHESIA FOLLOW-UP NOTE - NSEVALATIONFT_GEN_ALL_CORE
adductor canal block site clean dry and intact, on Q working properly no sideeffects continue with therapy discontinue catheter tomm after meds run out

## 2018-02-14 NOTE — PROGRESS NOTE ADULT - ASSESSMENT
A/P: 67M s/p L TKA POD 1  Pain Control  DVT ppx  WBAT  PT/OT  Maintain Onq PUMP  Incentive Spirometry  Medical management appreciated  DC planning

## 2018-02-15 DIAGNOSIS — I10 ESSENTIAL (PRIMARY) HYPERTENSION: ICD-10-CM

## 2018-02-15 LAB
ANION GAP SERPL CALC-SCNC: 9 MMOL/L — SIGNIFICANT CHANGE UP (ref 5–17)
BUN SERPL-MCNC: 16 MG/DL — SIGNIFICANT CHANGE UP (ref 7–23)
CALCIUM SERPL-MCNC: 8.4 MG/DL — LOW (ref 8.5–10.1)
CHLORIDE SERPL-SCNC: 96 MMOL/L — SIGNIFICANT CHANGE UP (ref 96–108)
CO2 SERPL-SCNC: 25 MMOL/L — SIGNIFICANT CHANGE UP (ref 22–31)
CREAT SERPL-MCNC: 1.3 MG/DL — SIGNIFICANT CHANGE UP (ref 0.5–1.3)
GLUCOSE SERPL-MCNC: 119 MG/DL — HIGH (ref 70–99)
HCT VFR BLD CALC: 35.7 % — LOW (ref 39–50)
HGB BLD-MCNC: 12.5 G/DL — LOW (ref 13–17)
MCHC RBC-ENTMCNC: 32.1 PG — SIGNIFICANT CHANGE UP (ref 27–34)
MCHC RBC-ENTMCNC: 35.1 GM/DL — SIGNIFICANT CHANGE UP (ref 32–36)
MCV RBC AUTO: 91.5 FL — SIGNIFICANT CHANGE UP (ref 80–100)
PLATELET # BLD AUTO: 228 K/UL — SIGNIFICANT CHANGE UP (ref 150–400)
POTASSIUM SERPL-MCNC: 4.2 MMOL/L — SIGNIFICANT CHANGE UP (ref 3.5–5.3)
POTASSIUM SERPL-SCNC: 4.2 MMOL/L — SIGNIFICANT CHANGE UP (ref 3.5–5.3)
RBC # BLD: 3.9 M/UL — LOW (ref 4.2–5.8)
RBC # FLD: 13.2 % — SIGNIFICANT CHANGE UP (ref 10.3–14.5)
SODIUM SERPL-SCNC: 130 MMOL/L — LOW (ref 135–145)
WBC # BLD: 10.6 K/UL — HIGH (ref 3.8–10.5)
WBC # FLD AUTO: 10.6 K/UL — HIGH (ref 3.8–10.5)

## 2018-02-15 RX ADMIN — SODIUM CHLORIDE 3 MILLILITER(S): 9 INJECTION INTRAMUSCULAR; INTRAVENOUS; SUBCUTANEOUS at 05:16

## 2018-02-15 RX ADMIN — ATORVASTATIN CALCIUM 20 MILLIGRAM(S): 80 TABLET, FILM COATED ORAL at 22:34

## 2018-02-15 RX ADMIN — POLYETHYLENE GLYCOL 3350 17 GRAM(S): 17 POWDER, FOR SOLUTION ORAL at 11:49

## 2018-02-15 RX ADMIN — OXYCODONE HYDROCHLORIDE 10 MILLIGRAM(S): 5 TABLET ORAL at 08:38

## 2018-02-15 RX ADMIN — Medication 30 MILLILITER(S): at 07:39

## 2018-02-15 RX ADMIN — Medication 325 MILLIGRAM(S): at 08:25

## 2018-02-15 RX ADMIN — Medication 325 MILLIGRAM(S): at 11:47

## 2018-02-15 RX ADMIN — OXYCODONE HYDROCHLORIDE 10 MILLIGRAM(S): 5 TABLET ORAL at 15:22

## 2018-02-15 RX ADMIN — ONDANSETRON 4 MILLIGRAM(S): 8 TABLET, FILM COATED ORAL at 06:48

## 2018-02-15 RX ADMIN — SUMATRIPTAN SUCCINATE 100 MILLIGRAM(S): 4 INJECTION, SOLUTION SUBCUTANEOUS at 16:53

## 2018-02-15 RX ADMIN — PANTOPRAZOLE SODIUM 40 MILLIGRAM(S): 20 TABLET, DELAYED RELEASE ORAL at 11:48

## 2018-02-15 RX ADMIN — OXYCODONE HYDROCHLORIDE 10 MILLIGRAM(S): 5 TABLET ORAL at 14:22

## 2018-02-15 RX ADMIN — Medication 100 MILLIGRAM(S): at 22:34

## 2018-02-15 RX ADMIN — Medication 100 MILLIGRAM(S): at 05:25

## 2018-02-15 RX ADMIN — Medication 1 TABLET(S): at 11:47

## 2018-02-15 RX ADMIN — SODIUM CHLORIDE 3 MILLILITER(S): 9 INJECTION INTRAMUSCULAR; INTRAVENOUS; SUBCUTANEOUS at 22:34

## 2018-02-15 RX ADMIN — Medication 500 MILLIGRAM(S): at 18:02

## 2018-02-15 RX ADMIN — OXYCODONE HYDROCHLORIDE 10 MILLIGRAM(S): 5 TABLET ORAL at 07:38

## 2018-02-15 RX ADMIN — TAMSULOSIN HYDROCHLORIDE 0.4 MILLIGRAM(S): 0.4 CAPSULE ORAL at 22:34

## 2018-02-15 RX ADMIN — Medication 1000 UNIT(S): at 22:34

## 2018-02-15 RX ADMIN — FINASTERIDE 5 MILLIGRAM(S): 5 TABLET, FILM COATED ORAL at 11:48

## 2018-02-15 RX ADMIN — SUMATRIPTAN SUCCINATE 100 MILLIGRAM(S): 4 INJECTION, SOLUTION SUBCUTANEOUS at 17:53

## 2018-02-15 RX ADMIN — Medication 30 MILLILITER(S): at 14:46

## 2018-02-15 RX ADMIN — Medication 500 MILLIGRAM(S): at 05:25

## 2018-02-15 RX ADMIN — Medication 100 MILLIGRAM(S): at 14:23

## 2018-02-15 RX ADMIN — Medication 1 MILLIGRAM(S): at 11:47

## 2018-02-15 RX ADMIN — ATENOLOL 50 MILLIGRAM(S): 25 TABLET ORAL at 05:25

## 2018-02-15 RX ADMIN — ENOXAPARIN SODIUM 30 MILLIGRAM(S): 100 INJECTION SUBCUTANEOUS at 18:07

## 2018-02-15 RX ADMIN — ENOXAPARIN SODIUM 30 MILLIGRAM(S): 100 INJECTION SUBCUTANEOUS at 05:25

## 2018-02-15 RX ADMIN — SODIUM CHLORIDE 3 MILLILITER(S): 9 INJECTION INTRAMUSCULAR; INTRAVENOUS; SUBCUTANEOUS at 14:24

## 2018-02-15 NOTE — ANESTHESIA FOLLOW-UP NOTE - NSEVALATIONFT_GEN_ALL_CORE
Patient catheter examined, catheter clamped with no local anesthetic running through catheter, patients catheter clamp removed to prevent accidental clamping in the future, if patient to be dc today, catheter can be removed

## 2018-02-15 NOTE — PROGRESS NOTE ADULT - ASSESSMENT
A/P: 67M s/p L TKA POD 2  Pain Control  DVT ppx  WBAT  PT/OT  Maintain Onq PUMP  Incentive Spirometry  Medical management appreciated  DC planning

## 2018-02-16 VITALS
OXYGEN SATURATION: 95 % | HEART RATE: 68 BPM | RESPIRATION RATE: 17 BRPM | SYSTOLIC BLOOD PRESSURE: 136 MMHG | TEMPERATURE: 99 F | DIASTOLIC BLOOD PRESSURE: 83 MMHG

## 2018-02-16 LAB
ANION GAP SERPL CALC-SCNC: 6 MMOL/L — SIGNIFICANT CHANGE UP (ref 5–17)
BUN SERPL-MCNC: 16 MG/DL — SIGNIFICANT CHANGE UP (ref 7–23)
CALCIUM SERPL-MCNC: 8.8 MG/DL — SIGNIFICANT CHANGE UP (ref 8.5–10.1)
CHLORIDE SERPL-SCNC: 96 MMOL/L — SIGNIFICANT CHANGE UP (ref 96–108)
CO2 SERPL-SCNC: 30 MMOL/L — SIGNIFICANT CHANGE UP (ref 22–31)
CREAT SERPL-MCNC: 1.3 MG/DL — SIGNIFICANT CHANGE UP (ref 0.5–1.3)
GLUCOSE SERPL-MCNC: 107 MG/DL — HIGH (ref 70–99)
HCT VFR BLD CALC: 34.8 % — LOW (ref 39–50)
HGB BLD-MCNC: 12.1 G/DL — LOW (ref 13–17)
MCHC RBC-ENTMCNC: 31.7 PG — SIGNIFICANT CHANGE UP (ref 27–34)
MCHC RBC-ENTMCNC: 34.7 GM/DL — SIGNIFICANT CHANGE UP (ref 32–36)
MCV RBC AUTO: 91.4 FL — SIGNIFICANT CHANGE UP (ref 80–100)
PLATELET # BLD AUTO: 237 K/UL — SIGNIFICANT CHANGE UP (ref 150–400)
POTASSIUM SERPL-MCNC: 4 MMOL/L — SIGNIFICANT CHANGE UP (ref 3.5–5.3)
POTASSIUM SERPL-SCNC: 4 MMOL/L — SIGNIFICANT CHANGE UP (ref 3.5–5.3)
RBC # BLD: 3.8 M/UL — LOW (ref 4.2–5.8)
RBC # FLD: 12.7 % — SIGNIFICANT CHANGE UP (ref 10.3–14.5)
SODIUM SERPL-SCNC: 132 MMOL/L — LOW (ref 135–145)
WBC # BLD: 10.3 K/UL — SIGNIFICANT CHANGE UP (ref 3.8–10.5)
WBC # FLD AUTO: 10.3 K/UL — SIGNIFICANT CHANGE UP (ref 3.8–10.5)

## 2018-02-16 PROCEDURE — 73562 X-RAY EXAM OF KNEE 3: CPT

## 2018-02-16 PROCEDURE — 97161 PT EVAL LOW COMPLEX 20 MIN: CPT

## 2018-02-16 PROCEDURE — C1713: CPT

## 2018-02-16 PROCEDURE — 80048 BASIC METABOLIC PNL TOTAL CA: CPT

## 2018-02-16 PROCEDURE — 36415 COLL VENOUS BLD VENIPUNCTURE: CPT

## 2018-02-16 PROCEDURE — 97116 GAIT TRAINING THERAPY: CPT

## 2018-02-16 PROCEDURE — 97110 THERAPEUTIC EXERCISES: CPT

## 2018-02-16 PROCEDURE — 85027 COMPLETE CBC AUTOMATED: CPT

## 2018-02-16 PROCEDURE — 88311 DECALCIFY TISSUE: CPT

## 2018-02-16 PROCEDURE — C1776: CPT

## 2018-02-16 PROCEDURE — 88305 TISSUE EXAM BY PATHOLOGIST: CPT

## 2018-02-16 PROCEDURE — 97530 THERAPEUTIC ACTIVITIES: CPT

## 2018-02-16 RX ADMIN — Medication 1 MILLIGRAM(S): at 11:28

## 2018-02-16 RX ADMIN — ATENOLOL 50 MILLIGRAM(S): 25 TABLET ORAL at 06:12

## 2018-02-16 RX ADMIN — Medication 1 TABLET(S): at 11:28

## 2018-02-16 RX ADMIN — FINASTERIDE 5 MILLIGRAM(S): 5 TABLET, FILM COATED ORAL at 11:28

## 2018-02-16 RX ADMIN — SODIUM CHLORIDE 3 MILLILITER(S): 9 INJECTION INTRAMUSCULAR; INTRAVENOUS; SUBCUTANEOUS at 06:11

## 2018-02-16 RX ADMIN — PANTOPRAZOLE SODIUM 40 MILLIGRAM(S): 20 TABLET, DELAYED RELEASE ORAL at 11:28

## 2018-02-16 RX ADMIN — ENOXAPARIN SODIUM 30 MILLIGRAM(S): 100 INJECTION SUBCUTANEOUS at 06:12

## 2018-02-16 RX ADMIN — POLYETHYLENE GLYCOL 3350 17 GRAM(S): 17 POWDER, FOR SOLUTION ORAL at 11:28

## 2018-02-16 RX ADMIN — OXYCODONE HYDROCHLORIDE 10 MILLIGRAM(S): 5 TABLET ORAL at 12:36

## 2018-02-16 RX ADMIN — Medication 500 MILLIGRAM(S): at 06:12

## 2018-02-16 RX ADMIN — OXYCODONE HYDROCHLORIDE 10 MILLIGRAM(S): 5 TABLET ORAL at 11:36

## 2018-02-16 RX ADMIN — Medication 100 MILLIGRAM(S): at 13:36

## 2018-02-16 RX ADMIN — Medication 100 MILLIGRAM(S): at 06:12

## 2018-02-16 NOTE — PROGRESS NOTE ADULT - SUBJECTIVE AND OBJECTIVE BOX
Patient is a 67y old  Male who presents with a chief complaint of Pt states " I am having a LEFT Knee Replacement." (13 Feb 2018 12:26)      INTERVAL /OVERNIGHT EVENTS: c/o L knee pain    MEDICATIONS  (STANDING):  ascorbic acid 500 milliGRAM(s) Oral two times a day  ATENolol  Tablet 50 milliGRAM(s) Oral daily  atorvastatin 20 milliGRAM(s) Oral at bedtime  BUpivacaine 0.375% On-Q Pump 400 milliLiter(s) (4 mL/Hr) IntraDermal. <Continuous>  cholecalciferol 1000 Unit(s) Oral at bedtime  docusate sodium 100 milliGRAM(s) Oral three times a day  enoxaparin Injectable 30 milliGRAM(s) SubCutaneous every 12 hours  ferrous    sulfate 325 milliGRAM(s) Oral three times a day with meals  finasteride 5 milliGRAM(s) Oral daily  folic acid 1 milliGRAM(s) Oral daily  multivitamin 1 Tablet(s) Oral daily  pantoprazole    Tablet 40 milliGRAM(s) Oral daily  polyethylene glycol 3350 17 Gram(s) Oral daily  sodium chloride 0.9% lock flush 3 milliLiter(s) IV Push every 8 hours  tamsulosin 0.4 milliGRAM(s) Oral at bedtime    MEDICATIONS  (PRN):  acetaminophen   Tablet 650 milliGRAM(s) Oral every 6 hours PRN For Temp greater than 38 C (100.4 F)  acetaminophen   Tablet. 650 milliGRAM(s) Oral every 6 hours PRN Headache  aluminum hydroxide/magnesium hydroxide/simethicone Suspension 30 milliLiter(s) Oral four times a day PRN Indigestion  benzocaine 15 mG/menthol 3.6 mG Lozenge 1 Lozenge Oral every 2 hours PRN Sore Throat  diphenhydrAMINE   Capsule 25 milliGRAM(s) Oral every 4 hours PRN Rash and/or Itching  diphenhydrAMINE   Capsule 25 milliGRAM(s) Oral at bedtime PRN Insomnia  HYDROmorphone  Injectable 0.5 milliGRAM(s) IV Push every 4 hours PRN Severe Pain  magnesium hydroxide Suspension 30 milliLiter(s) Oral daily PRN Constipation  ondansetron Injectable 4 milliGRAM(s) IV Push every 6 hours PRN Nausea and/or Vomiting  oxyCODONE    IR 5 milliGRAM(s) Oral every 4 hours PRN Mild Pain  oxyCODONE    IR 10 milliGRAM(s) Oral every 4 hours PRN Moderate Pain  senna 2 Tablet(s) Oral at bedtime PRN Constipation  SUMAtriptan 100 milliGRAM(s) Oral daily PRN headache      Allergies    No Known Allergies    Intolerances        REVIEW OF SYSTEMS:  CONSTITUTIONAL: No fever, weight loss, or fatigue  EYES: No eye pain, visual disturbances, or discharge  ENMT:  No difficulty hearing, tinnitus, vertigo; No sinus or throat pain  NECK: No pain or stiffness  RESPIRATORY: No cough, wheezing, chills or hemoptysis; No shortness of breath  CARDIOVASCULAR: No chest pain, palpitations, dizziness, or leg swelling  GASTROINTESTINAL: No abdominal or epigastric pain. No nausea, vomiting, or hematemesis; No diarrhea or constipation. No melena or hematochezia.  GENITOURINARY: No dysuria, frequency, hematuria, or incontinence  NEUROLOGICAL: No headaches, memory loss, loss of strength, numbness, or tremors  SKIN: No itching, burning, rashes, or lesions   LYMPH NODES: No enlarged glands  ENDOCRINE: No heat or cold intolerance; No hair loss; No polydipsia or polyuria  MUSCULOSKELETAL: + joint pain or swelling; No muscle, back, or extremity pain  PSYCHIATRIC: No depression, anxiety, mood swings, or difficulty sleeping  HEME/LYMPH: No easy bruising, or bleeding gums  ALLERGY AND IMMUNOLOGIC: No hives or eczema    Vital Signs Last 24 Hrs  T(C): 36.5 (14 Feb 2018 07:56), Max: 37.1 (14 Feb 2018 00:29)  T(F): 97.7 (14 Feb 2018 07:56), Max: 98.7 (14 Feb 2018 00:29)  HR: 80 (14 Feb 2018 07:56) (80 - 114)  BP: 158/98 (14 Feb 2018 07:56) (130/82 - 158/98)  BP(mean): --  RR: 16 (14 Feb 2018 07:56) (16 - 17)  SpO2: 97% (14 Feb 2018 07:56) (92% - 97%)    PHYSICAL EXAM:  GENERAL: NAD, well-groomed, well-developed  HEAD:  Atraumatic, Normocephalic  EYES: EOMI, PERRLA, conjunctiva and sclera clear  ENMT: No tonsillar erythema, exudates, or enlargement; Moist mucous membranes, Good dentition, No lesions  NECK: Supple, No JVD, Normal thyroid  NERVOUS SYSTEM:  Alert & Oriented X3, Good concentration; Motor Strength 5/5 B/L upper and lower extremities; DTRs 2+ intact and symmetric  CHEST/LUNG: Clear to auscultation bilaterally; No rales, rhonchi, wheezing, or rubs  HEART: Regular rate and rhythm; No murmurs, rubs, or gallops  ABDOMEN: Soft, Nontender, Nondistended; Bowel sounds present  EXTREMITIES:  2+ Peripheral Pulses, No clubbing, cyanosis, or edema  LYMPH: No lymphadenopathy noted  SKIN: No rashes or lesions    LABS:                        12.4   13.6  )-----------( 294      ( 14 Feb 2018 08:53 )             37.6     14 Feb 2018 08:53    136    |  101    |  17     ----------------------------<  111    4.1     |  29     |  1.60     Ca    8.9        14 Feb 2018 08:53          CAPILLARY BLOOD GLUCOSE          RADIOLOGY & ADDITIONAL TESTS:    Notes Reviewed:  [ x] YES  [ ] NO    Care Discussed with Consultants/Other Providers [x ] YES  [ ] NO
Patient is a 67y old  Male who presents with a chief complaint of Pt states " I am having a LEFT Knee Replacement." (13 Feb 2018 12:26)      INTERVAL /OVERNIGHT EVENTS: feels better    MEDICATIONS  (STANDING):  ascorbic acid 500 milliGRAM(s) Oral two times a day  ATENolol  Tablet 50 milliGRAM(s) Oral daily  atorvastatin 20 milliGRAM(s) Oral at bedtime  BUpivacaine 0.375% On-Q Pump 400 milliLiter(s) (4 mL/Hr) IntraDermal. <Continuous>  cholecalciferol 1000 Unit(s) Oral at bedtime  docusate sodium 100 milliGRAM(s) Oral three times a day  enoxaparin Injectable 30 milliGRAM(s) SubCutaneous every 12 hours  ferrous    sulfate 325 milliGRAM(s) Oral three times a day with meals  finasteride 5 milliGRAM(s) Oral daily  folic acid 1 milliGRAM(s) Oral daily  multivitamin 1 Tablet(s) Oral daily  pantoprazole    Tablet 40 milliGRAM(s) Oral daily  polyethylene glycol 3350 17 Gram(s) Oral daily  sodium chloride 0.9% lock flush 3 milliLiter(s) IV Push every 8 hours  tamsulosin 0.4 milliGRAM(s) Oral at bedtime    MEDICATIONS  (PRN):  acetaminophen   Tablet 650 milliGRAM(s) Oral every 6 hours PRN For Temp greater than 38 C (100.4 F)  acetaminophen   Tablet. 650 milliGRAM(s) Oral every 6 hours PRN Headache  aluminum hydroxide/magnesium hydroxide/simethicone Suspension 30 milliLiter(s) Oral four times a day PRN Indigestion  benzocaine 15 mG/menthol 3.6 mG Lozenge 1 Lozenge Oral every 2 hours PRN Sore Throat  bisacodyl Suppository 10 milliGRAM(s) Rectal daily PRN If no bowel movement by postoperative day #2  diphenhydrAMINE   Capsule 25 milliGRAM(s) Oral every 4 hours PRN Rash and/or Itching  diphenhydrAMINE   Capsule 25 milliGRAM(s) Oral at bedtime PRN Insomnia  HYDROmorphone  Injectable 0.5 milliGRAM(s) IV Push every 4 hours PRN Severe Pain  magnesium hydroxide Suspension 30 milliLiter(s) Oral daily PRN Constipation  ondansetron Injectable 4 milliGRAM(s) IV Push every 6 hours PRN Nausea and/or Vomiting  oxyCODONE    IR 5 milliGRAM(s) Oral every 4 hours PRN Mild Pain  oxyCODONE    IR 10 milliGRAM(s) Oral every 4 hours PRN Moderate Pain  senna 2 Tablet(s) Oral at bedtime PRN Constipation  SUMAtriptan 100 milliGRAM(s) Oral daily PRN headache      Allergies    No Known Allergies    Intolerances        REVIEW OF SYSTEMS:  CONSTITUTIONAL: No fever, weight loss, or fatigue  EYES: No eye pain, visual disturbances, or discharge  ENMT:  No difficulty hearing, tinnitus, vertigo; No sinus or throat pain  NECK: No pain or stiffness  RESPIRATORY: No cough, wheezing, chills or hemoptysis; No shortness of breath  CARDIOVASCULAR: No chest pain, palpitations, dizziness, or leg swelling  GASTROINTESTINAL: No abdominal or epigastric pain. No nausea, vomiting, or hematemesis; No diarrhea or constipation. No melena or hematochezia.  GENITOURINARY: No dysuria, frequency, hematuria, or incontinence  NEUROLOGICAL: No headaches, memory loss, loss of strength, numbness, or tremors  SKIN: No itching, burning, rashes, or lesions   LYMPH NODES: No enlarged glands  ENDOCRINE: No heat or cold intolerance; No hair loss; No polydipsia or polyuria  MUSCULOSKELETAL: No joint pain or swelling; No muscle, back, or extremity pain  PSYCHIATRIC: No depression, anxiety, mood swings, or difficulty sleeping  HEME/LYMPH: No easy bruising, or bleeding gums  ALLERGY AND IMMUNOLOGIC: No hives or eczema    Vital Signs Last 24 Hrs  T(C): 37.1 (16 Feb 2018 07:51), Max: 37.6 (15 Feb 2018 16:35)  T(F): 98.7 (16 Feb 2018 07:51), Max: 99.6 (15 Feb 2018 16:35)  HR: 68 (16 Feb 2018 07:51) (68 - 88)  BP: 136/83 (16 Feb 2018 07:51) (136/83 - 149/87)  BP(mean): --  RR: 17 (16 Feb 2018 07:51) (17 - 18)  SpO2: 95% (16 Feb 2018 07:51) (95% - 96%)    PHYSICAL EXAM:  GENERAL: NAD, well-groomed, well-developed  HEAD:  Atraumatic, Normocephalic  EYES: EOMI, PERRLA, conjunctiva and sclera clear  ENMT: No tonsillar erythema, exudates, or enlargement; Moist mucous membranes, Good dentition, No lesions  NECK: Supple, No JVD, Normal thyroid  NERVOUS SYSTEM:  Alert & Oriented X3, Good concentration; Motor Strength 5/5 B/L upper and lower extremities; DTRs 2+ intact and symmetric  CHEST/LUNG: Clear to auscultation bilaterally; No rales, rhonchi, wheezing, or rubs  HEART: Regular rate and rhythm; No murmurs, rubs, or gallops  ABDOMEN: Soft, Nontender, Nondistended; Bowel sounds present  EXTREMITIES:  2+ Peripheral Pulses, No clubbing, cyanosis, or edema  LYMPH: No lymphadenopathy noted  SKIN: No rashes or lesions    LABS:                        12.1   10.3  )-----------( 237      ( 16 Feb 2018 08:35 )             34.8     16 Feb 2018 08:35    132    |  96     |  16     ----------------------------<  107    4.0     |  30     |  1.30     Ca    8.8        16 Feb 2018 08:35          CAPILLARY BLOOD GLUCOSE          RADIOLOGY & ADDITIONAL TESTS:    Notes Reviewed:  [x ] YES  [ ] NO    Care Discussed with Consultants/Other Providers [x ] YES  [ ] NO
Patient seen and examined at bedside, no acute events overnight, pain controlled    Vital Signs Last 24 Hrs  T(C): 37 (14 Feb 2018 04:50), Max: 37.1 (14 Feb 2018 00:29)  T(F): 98.6 (14 Feb 2018 04:50), Max: 98.7 (14 Feb 2018 00:29)  HR: 103 (14 Feb 2018 04:50) (67 - 114)  BP: 130/82 (14 Feb 2018 04:50) (106/56 - 158/73)  BP(mean): --  RR: 16 (14 Feb 2018 04:50) (16 - 20)  SpO2: 95% (14 Feb 2018 04:50) (92% - 97%)                          13.0   7.6   )-----------( 235      ( 13 Feb 2018 13:28 )             38.8       Physical Exam:  General: Not in acute distress, resting comfortably  Left Lower Extremity: Dressing is clean, dry, and intact. Sensation intact to light touch in distribution of L2-S1. Extensor Hallucis Longus, Flexor Hallucis Longus, Tibialis Anterior, and Gastrocnemius/Soleus complex intact. Dorsalis Pedis and Posterior tibial artery 2+. All compartments are soft and compressible. No tenderness to palpation of the calves bilaterally.
Patient seen and examined at bedside, no acute events overnight, pain controlled  Dressing changed 2/15, new 4x4 and tagaderm  Pt expresses concern over elevated BP    Vital Signs Last 24 Hrs  T(C): 37.1 (15 Feb 2018 04:46), Max: 37.5 (14 Feb 2018 22:07)  T(F): 98.8 (15 Feb 2018 04:46), Max: 99.5 (14 Feb 2018 22:07)  HR: 90 (15 Feb 2018 04:46) (79 - 90)  BP: 159/98 (15 Feb 2018 04:46) (158/98 - 171/91)  BP(mean): --  RR: 17 (15 Feb 2018 04:46) (16 - 17)  SpO2: 95% (15 Feb 2018 04:46) (94% - 97%)        Physical Exam:  General: Not in acute distress, resting comfortably  Left Lower Extremity:   Incision looks healing, dressing removed and new one applied  Dressing is clean, dry, and intact.   Sensation intact to light touch in distribution of L2-S1.   Extensor Hallucis Longus, Flexor Hallucis Longus, Tibialis Anterior, and Gastrocnemius/Soleus complex intact.   Dorsalis Pedis and Posterior tibial artery 2+.   All compartments are soft and compressible. No tenderness to palpation of the calves bilaterally.
Patient seen and examined. Pain controlled. No acute events overnight                        12.5   10.6  )-----------( 228      ( 15 Feb 2018 07:41 )             35.7     15 Feb 2018 07:41    130    |  96     |  16     ----------------------------<  119    4.2     |  25     |  1.30     Ca    8.4        15 Feb 2018 07:41        Vital Signs Last 24 Hrs  T(C): 36.7 (02-16-18 @ 00:00), Max: 37.6 (02-15-18 @ 16:35)  T(F): 98.1 (02-16-18 @ 00:00), Max: 99.6 (02-15-18 @ 16:35)  HR: 88 (02-16-18 @ 00:00) (71 - 88)  BP: 140/86 (02-16-18 @ 00:00) (136/86 - 149/87)  BP(mean): --  RR: 18 (02-16-18 @ 00:00) (17 - 18)  SpO2: 96% (02-16-18 @ 00:00) (95% - 96%)    Physical Exam  Gen: NAD  LLE:   Dressing c/d/i  +adductor canal/OnQ cath  +ehl/fhl/ta/gs function  L2-S1 silt  Dp/pt pulse intact  No calf ttp  Compartments soft    A/P: 67y Male sp L TKA POD 3  Pain control  DVT ppx  DC OnQ pump  PT/WBAT/OOB  FU labs  Ice/elevate  Medical management appreciated  Incentive spirometry  Dispo planning
Post Op Day 3 of Anesthesia Pain Management Service    SUBJECTIVE: Doing well. Doing steps in PT at present.  		  OBJECTIVE:   Pain Score:  <4 /10  Therapy:	[ ] IV PCA	[ ] Epidural   [ ] s/p Spinal Opioid	[x ] Peripheral nerve block  	  Vital Signs Last 24 Hrs  T(C): 37.1 (16 Feb 2018 07:51), Max: 37.6 (15 Feb 2018 16:35)  T(F): 98.7 (16 Feb 2018 07:51), Max: 99.6 (15 Feb 2018 16:35)  HR: 68 (16 Feb 2018 07:51) (68 - 88)  BP: 136/83 (16 Feb 2018 07:51) (136/83 - 149/87)  BP(mean): --  RR: 17 (16 Feb 2018 07:51) (17 - 18)  SpO2: 95% (16 Feb 2018 07:51) (95% - 96%)    ( x) Alert & Oriented     (x ) No motor/sensory block     (- ) Nausea     (- ) Pruritis     (- ) Headache      ( ) Further Pain Rx Plan:  Oral Pain Medications    COMMENTS: For discharge home today. Ortho service to remove catheter (after PT) prior to discharge
Pt S/E at bedside in PACU, pain controlled, tolerated procedure well              Vital Signs Last 24 Hrs  T(C): 37 (02-13-18 @ 09:20), Max: 37 (02-13-18 @ 09:17)  T(F): 98.6 (02-13-18 @ 09:20), Max: 98.6 (02-13-18 @ 09:17)  HR: 67 (02-13-18 @ 09:20) (67 - 67)  BP: 151/95 (02-13-18 @ 09:20) (151/92 - 151/95)  BP(mean): --  RR: 16 (02-13-18 @ 09:20) (16 - 16)  SpO2: 97% (02-13-18 @ 09:20) (97% - 97%)    Gen: NAD,     Left Lower Extremity:  Dressing clean dry intact  +HMV  +Pain catheter  +EHL/FHL/TA/GS  SILT L3-S1  +DP/PT Pulses  Compartments soft  No calf TTP B/L
Patient is a 67y old  Male who presents with a chief complaint of Pt states " I am having a LEFT Knee Replacement." (13 Feb 2018 12:26)      INTERVAL /OVERNIGHT EVENTS: feels better    MEDICATIONS  (STANDING):  ascorbic acid 500 milliGRAM(s) Oral two times a day  ATENolol  Tablet 50 milliGRAM(s) Oral daily  atorvastatin 20 milliGRAM(s) Oral at bedtime  BUpivacaine 0.375% On-Q Pump 400 milliLiter(s) (4 mL/Hr) IntraDermal. <Continuous>  cholecalciferol 1000 Unit(s) Oral at bedtime  docusate sodium 100 milliGRAM(s) Oral three times a day  enoxaparin Injectable 30 milliGRAM(s) SubCutaneous every 12 hours  ferrous    sulfate 325 milliGRAM(s) Oral three times a day with meals  finasteride 5 milliGRAM(s) Oral daily  folic acid 1 milliGRAM(s) Oral daily  multivitamin 1 Tablet(s) Oral daily  pantoprazole    Tablet 40 milliGRAM(s) Oral daily  polyethylene glycol 3350 17 Gram(s) Oral daily  sodium chloride 0.9% lock flush 3 milliLiter(s) IV Push every 8 hours  tamsulosin 0.4 milliGRAM(s) Oral at bedtime    MEDICATIONS  (PRN):  acetaminophen   Tablet 650 milliGRAM(s) Oral every 6 hours PRN For Temp greater than 38 C (100.4 F)  acetaminophen   Tablet. 650 milliGRAM(s) Oral every 6 hours PRN Headache  aluminum hydroxide/magnesium hydroxide/simethicone Suspension 30 milliLiter(s) Oral four times a day PRN Indigestion  benzocaine 15 mG/menthol 3.6 mG Lozenge 1 Lozenge Oral every 2 hours PRN Sore Throat  bisacodyl Suppository 10 milliGRAM(s) Rectal daily PRN If no bowel movement by postoperative day #2  diphenhydrAMINE   Capsule 25 milliGRAM(s) Oral every 4 hours PRN Rash and/or Itching  diphenhydrAMINE   Capsule 25 milliGRAM(s) Oral at bedtime PRN Insomnia  HYDROmorphone  Injectable 0.5 milliGRAM(s) IV Push every 4 hours PRN Severe Pain  magnesium hydroxide Suspension 30 milliLiter(s) Oral daily PRN Constipation  ondansetron Injectable 4 milliGRAM(s) IV Push every 6 hours PRN Nausea and/or Vomiting  oxyCODONE    IR 5 milliGRAM(s) Oral every 4 hours PRN Mild Pain  oxyCODONE    IR 10 milliGRAM(s) Oral every 4 hours PRN Moderate Pain  senna 2 Tablet(s) Oral at bedtime PRN Constipation  SUMAtriptan 100 milliGRAM(s) Oral daily PRN headache      Allergies    No Known Allergies    Intolerances        REVIEW OF SYSTEMS:  CONSTITUTIONAL: No fever, weight loss, or fatigue  EYES: No eye pain, visual disturbances, or discharge  ENMT:  No difficulty hearing, tinnitus, vertigo; No sinus or throat pain  NECK: No pain or stiffness  RESPIRATORY: No cough, wheezing, chills or hemoptysis; No shortness of breath  CARDIOVASCULAR: No chest pain, palpitations, dizziness, or leg swelling  GASTROINTESTINAL: No abdominal or epigastric pain. No nausea, vomiting, or hematemesis; No diarrhea or constipation. No melena or hematochezia.  GENITOURINARY: No dysuria, frequency, hematuria, or incontinence  NEUROLOGICAL: No headaches, memory loss, loss of strength, numbness, or tremors  SKIN: No itching, burning, rashes, or lesions   LYMPH NODES: No enlarged glands  ENDOCRINE: No heat or cold intolerance; No hair loss; No polydipsia or polyuria  MUSCULOSKELETAL: No joint pain or swelling; No muscle, back, or extremity pain  PSYCHIATRIC: No depression, anxiety, mood swings, or difficulty sleeping  HEME/LYMPH: No easy bruising, or bleeding gums  ALLERGY AND IMMUNOLOGIC: No hives or eczema    Vital Signs Last 24 Hrs  T(C): 37 (15 Feb 2018 08:08), Max: 37.5 (14 Feb 2018 22:07)  T(F): 98.6 (15 Feb 2018 08:08), Max: 99.5 (14 Feb 2018 22:07)  HR: 71 (15 Feb 2018 08:08) (71 - 90)  BP: 136/86 (15 Feb 2018 08:08) (136/86 - 171/91)  BP(mean): --  RR: 17 (15 Feb 2018 08:08) (16 - 17)  SpO2: 95% (15 Feb 2018 08:08) (94% - 97%)    PHYSICAL EXAM:  GENERAL: NAD, well-groomed, well-developed  HEAD:  Atraumatic, Normocephalic  EYES: EOMI, PERRLA, conjunctiva and sclera clear  ENMT: No tonsillar erythema, exudates, or enlargement; Moist mucous membranes, Good dentition, No lesions  NECK: Supple, No JVD, Normal thyroid  NERVOUS SYSTEM:  Alert & Oriented X3, Good concentration; Motor Strength 5/5 B/L upper and lower extremities; DTRs 2+ intact and symmetric  CHEST/LUNG: Clear to auscultation bilaterally; No rales, rhonchi, wheezing, or rubs  HEART: Regular rate and rhythm; No murmurs, rubs, or gallops  ABDOMEN: Soft, Nontender, Nondistended; Bowel sounds present  EXTREMITIES:  2+ Peripheral Pulses, No clubbing, cyanosis, or edema  LYMPH: No lymphadenopathy noted  SKIN: No rashes or lesions    LABS:                        12.5   10.6  )-----------( 228      ( 15 Feb 2018 07:41 )             35.7     15 Feb 2018 07:41    130    |  96     |  16     ----------------------------<  119    4.2     |  25     |  1.30     Ca    8.4        15 Feb 2018 07:41          CAPILLARY BLOOD GLUCOSE          RADIOLOGY & ADDITIONAL TESTS:    Notes Reviewed:  [x ] YES  [ ] NO    Care Discussed with Consultants/Other Providers [x ] YES  [ ] NO

## 2018-02-16 NOTE — PROGRESS NOTE ADULT - PROVIDER SPECIALTY LIST ADULT
Anesthesia
Family Medicine
Family Medicine
Orthopedics
Family Medicine

## 2018-02-16 NOTE — PROGRESS NOTE ADULT - PROBLEM SELECTOR PROBLEM 1
Unilateral primary osteoarthritis, left knee

## 2019-08-29 NOTE — PHYSICAL THERAPY INITIAL EVALUATION ADULT - LEVEL OF INDEPENDENCE: STAND/SIT, REHAB EVAL
Pt is stable. Alert and oriented. Respirations even and unlabored, denies pain and does not appear to be in distress. Wearing home CPAP. Safety measures in place, bed low and locked, call light in reach. SBAR report to be given to oncoming nurse. contact guard

## 2020-07-12 NOTE — ASU PREOP CHECKLIST - AS TEMP SITE
Tobacco use is unchanged.  Patient was counseled on smoking cessation for approximately 7 minutes. Patient will quit smoking today 7/12/2020.  Smoking cessation counseling was provided.  Pharmacotherapy was prescribed as ordered.  Tobacco use will be reassessed in 3 months.   oral

## 2021-02-03 NOTE — BRIEF OPERATIVE NOTE - CONDITION POST OP
OFFICE VISIT      Patient: Mary Beth Hunter Date of Service: 2/3/2021   : 1994 MRN: [id number]     SUBJECTIVE:   HISTORY OF PRESENT ILLNESS:  Mary Beth Hunter is a 26 year old female who presents today for   Chief Complaint   Patient presents with   • Office Visit   • Physical     wants to be retested for cholestorol. gyne stated it may be bc of birth control, so not taking it right now       HPI  Patient is a 27 yo f with PMHX HLD 2/2 OCP here for cholesterol f/u   - patient was previously on OCPs, lipid panel in  showed elevated cholesterol   - patient stopped OCP in 10/2019   - patient has improved lifestyle and lost 15 lbs since  - would like to get retested for cholesterol   - denies FHX of HLD that she knows of  - denies h/o cigarette smoking    - patient was on Accutane in her 20's; she had elevated cholesterol due to it. She is unsure how high it was     PAST MEDICAL HISTORY:  Past Medical History:   Diagnosis Date   • Irregular menstrual cycle        MEDICATIONS:  Current Outpatient Medications   Medication Sig   • Omega-3 Fatty Acids (FISH OIL) 500 MG capsule Take 500 mg by mouth 2 times daily.   • Calcium Carb-Cholecalciferol (CALCIUM 500 +D PO)    • Norgestimate-Ethinyl Estradiol (TRINESSA LO) 0.18/0.215/0.25 MG-25 MCG tablet Take 1 tablet by mouth daily.   • Dietary Management Product (NICAPRIN) Tab Take 1 tablet by mouth daily.   • Minocycline HCl ER 90 MG CAPSULE SR 24 HR Take 90 mg by mouth daily.     No current facility-administered medications for this visit.        ALLERGIES:  ALLERGIES:  No Known Allergies    PAST SURGICAL HISTORY:  History reviewed. No pertinent surgical history.    FAMILY HISTORY:  Family History   Problem Relation Age of Onset   • Depression Father    • Thyroid Father    • Hypertension Maternal Grandmother    • Birth defects Maternal Grandfather    • Cancer, Colon Maternal Grandfather    • Cancer, Ovarian Maternal Aunt        SOCIAL HISTORY:  Social History     Tobacco Use    • Smoking status: Never Smoker   • Smokeless tobacco: Never Used   Substance Use Topics   • Alcohol use: Yes     Alcohol/week: 2.0 standard drinks     Types: 1 Glasses of wine, 1 Standard drinks or equivalent per week     Frequency: Monthly or less     Drinks per session: 1 or 2     Binge frequency: Never   • Drug use: Never       SCREENINGS:   No exam data present    Recent PHQ 2/9 Score    PHQ 2:  Date Adult PHQ 2 Score Adult PHQ 2 Interpretation   2/3/2021 0 No further screening needed       PHQ 9:     Most Recent MARI 7 Score            Review of Systems   All other systems reviewed and are negative.        OBJECTIVE:     Visit Vitals  /74   Pulse 87   Temp 96 °F (35.6 °C) (Tympanic)   Resp 18   Ht 5' 3\" (1.6 m)   Wt 55 kg (121 lb 4.1 oz)   BMI 21.48 kg/m²       Physical Exam   Constitutional: She is oriented to person, place, and time. She appears well-developed and well-nourished.   HENT:   Head: Normocephalic and atraumatic.   Eyes: Pupils are equal, round, and reactive to light. Conjunctivae are normal.   Neck: Normal range of motion.   Cardiovascular: Normal rate and regular rhythm.   Pulmonary/Chest: Effort normal and breath sounds normal.   Abdominal: Soft. Bowel sounds are normal.   Musculoskeletal: Normal range of motion.   Neurological: She is alert and oriented to person, place, and time.   Nursing note and vitals reviewed.      DIAGNOSTIC STUDIES:   LAB RESULTS:    Lab Results Reviewed    No visits with results within 1 Month(s) from this visit.   Latest known visit with results is:   Lab Services on 05/20/2020   Component Date Value Ref Range Status   • Albumin 05/20/2020 4.6  3.6 - 5.1 g/dL Final   • TOTAL BILIRUBIN 05/20/2020 0.6  0.2 - 1.0 mg/dL Final   • DIRECT BILIRUBIN 05/20/2020 0.2  0.0 - 0.2 mg/dL Final   • ALK PHOSPHATASE 05/20/2020 75  45 - 117 Units/L Final   • ALT/SGPT 05/20/2020 19  <64 Units/L Final   • AST/SGOT 05/20/2020 17  <38 Units/L Final   • TOTAL PROTEIN 05/20/2020 8.1   6.4 - 8.2 g/dL Final   • WBC 05/20/2020 7.4  4.2 - 11.0 K/mcL Final   • RBC 05/20/2020 4.77  4.00 - 5.20 mil/mcL Final   • HGB 05/20/2020 13.9  12.0 - 15.5 g/dL Final   • HCT 05/20/2020 42.6  36.0 - 46.5 % Final   • MCV 05/20/2020 89.3  78.0 - 100.0 fl Final   • MCH 05/20/2020 29.1  26.0 - 34.0 pg Final   • MCHC 05/20/2020 32.6  32.0 - 36.5 g/dL Final   • RDW-CV 05/20/2020 11.8  11.0 - 15.0 % Final   • PLT 05/20/2020 344  140 - 450 K/mcL Final   • NRBC 05/20/2020 0  0 /100 WBC Final   • DIFF TYPE 05/20/2020 AUTOMATED DIFFERENTIAL   Final   • Neutrophil 05/20/2020 57  % Final   • LYMPH 05/20/2020 34  % Final   • MONO 05/20/2020 7  % Final   • EOSIN 05/20/2020 1  % Final   • BASO 05/20/2020 1  % Final   • Percent Immature Granuloctyes 05/20/2020 0  % Final   • Absolute Neutrophil 05/20/2020 4.3  1.8 - 7.7 K/mcL Final   • Absolute Lymph 05/20/2020 2.5  1.0 - 4.8 K/mcL Final   • Absolute Mono 05/20/2020 0.5  0.3 - 0.9 K/mcL Final   • Absolute Eos 05/20/2020 0.1  0.1 - 0.5 K/mcL Final   • Absolute Baso 05/20/2020 0.0  0.0 - 0.3 K/mcL Final   • Absolute Immature Granulocytes 05/20/2020 0.0  0 - 0.2 K/mcl Final       ASSESSMENT AND PLAN:   This is a 26 year old year-old female who presents with   Chief Complaint   Patient presents with   • Office Visit   • Physical     wants to be retested for cholestorol. gyne stated it may be bc of birth control, so not taking it right now   .      Problem List Items Addressed This Visit        Endocrine    Elevated triglycerides with high cholesterol - Primary     - will repeat fasting cholesterol   - discussed continued lifestyle modification and discussed cholesterol with patient extensively   - handouts given          Relevant Orders    LIPID PANEL WITH REFLEX          Return if symptoms worsen or fail to improve.      Instructions provided as documented in the AVS.      The patient indicated understanding of the diagnosis and agreed with the plan of care.      Cindy Wright MD   stable

## 2023-02-27 PROBLEM — I10 ESSENTIAL (PRIMARY) HYPERTENSION: Chronic | Status: ACTIVE | Noted: 2018-01-30

## 2023-02-27 PROBLEM — G43.909 MIGRAINE, UNSPECIFIED, NOT INTRACTABLE, WITHOUT STATUS MIGRAINOSUS: Chronic | Status: ACTIVE | Noted: 2018-01-30

## 2023-02-27 PROBLEM — M17.12 UNILATERAL PRIMARY OSTEOARTHRITIS, LEFT KNEE: Chronic | Status: ACTIVE | Noted: 2018-01-30

## 2023-04-21 NOTE — PATIENT PROFILE ADULT. - SELF-CARE: EQUIPMENT USED AT HOME, PROFILE
ANTICOAGULATION MANAGEMENT     Jesus Casper 82 ELLE MORENO    Indications    Long term (Melo Vargas MD Referring Internal Medicine [phone]             no

## 2023-04-25 ENCOUNTER — APPOINTMENT (OUTPATIENT)
Dept: ELECTROPHYSIOLOGY | Facility: CLINIC | Age: 73
End: 2023-04-25
Payer: MEDICARE

## 2023-04-25 ENCOUNTER — NON-APPOINTMENT (OUTPATIENT)
Age: 73
End: 2023-04-25

## 2023-04-25 VITALS
DIASTOLIC BLOOD PRESSURE: 89 MMHG | SYSTOLIC BLOOD PRESSURE: 165 MMHG | HEART RATE: 60 BPM | HEIGHT: 66 IN | OXYGEN SATURATION: 96 % | WEIGHT: 210 LBS | BODY MASS INDEX: 33.75 KG/M2

## 2023-04-25 PROCEDURE — 99204 OFFICE O/P NEW MOD 45 MIN: CPT

## 2023-04-25 PROCEDURE — 93000 ELECTROCARDIOGRAM COMPLETE: CPT

## 2023-04-25 RX ORDER — FAMOTIDINE 40 MG/1
40 TABLET, FILM COATED ORAL DAILY
Qty: 90 | Refills: 1 | Status: ACTIVE | COMMUNITY
Start: 2023-04-25

## 2023-04-25 RX ORDER — FINASTERIDE 5 MG/1
5 TABLET, FILM COATED ORAL DAILY
Refills: 0 | Status: ACTIVE | COMMUNITY
Start: 2023-04-25

## 2023-04-25 RX ORDER — METOPROLOL SUCCINATE 50 MG/1
50 TABLET, EXTENDED RELEASE ORAL DAILY
Refills: 6 | Status: ACTIVE | COMMUNITY
Start: 2023-04-25

## 2023-04-25 RX ORDER — APIXABAN 5 MG/1
5 TABLET, FILM COATED ORAL
Refills: 6 | Status: ACTIVE | COMMUNITY
Start: 2023-04-25

## 2023-04-25 NOTE — PHYSICAL EXAM
[General Appearance - Well Developed] : well developed [Normal Appearance] : normal appearance [General Appearance - Well Nourished] : well nourished [Normal Conjunctiva] : the conjunctiva exhibited no abnormalities [Normal Oral Mucosa] : normal oral mucosa [Normal Oropharynx] : normal oropharynx [Normal Jugular Venous V Waves Present] : normal jugular venous V waves present [] : no respiratory distress [Respiration, Rhythm And Depth] : normal respiratory rhythm and effort [Auscultation Breath Sounds / Voice Sounds] : lungs were clear to auscultation bilaterally [Heart Rate And Rhythm] : heart rate and rhythm were normal [Heart Sounds] : normal S1 and S2 [Murmurs] : no murmurs present [Arterial Pulses Normal] : the arterial pulses were normal [Edema] : no peripheral edema present [Veins - Varicosity Changes] : no varicosital changes were noted in the lower extremities [Bowel Sounds] : normal bowel sounds [Abdomen Soft] : soft [Abnormal Walk] : normal gait [Skin Color & Pigmentation] : normal skin color and pigmentation [Skin Turgor] : normal skin turgor [Oriented To Time, Place, And Person] : oriented to person, place, and time [Impaired Insight] : insight and judgment were intact

## 2023-04-26 NOTE — HISTORY OF PRESENT ILLNESS
[FreeTextEntry1] : Mr. Sargent is a very pleasant 73 year old man status post ablation for persistent atrial fibrillation (PVI, CTI and LA roof ablation in 2013).  He has been doing well, but he went to his PMD approximately 1 month ago and was told he was in atrial fibrillation.  He was started back on Eliquis. He has not noticed any palpitations, but he has noted some shortness of breath.  He does exercise, although he has been limited by orthopedic issues (hip pain).

## 2023-04-26 NOTE — DISCUSSION/SUMMARY
[FreeTextEntry1] : Mr. Sargent is a very pleasant 73 year old man status post ablation for persistent atrial fibrillation (PVI, CTI and LA roof ablation in 2013).  He has done well for some time, but has recently been found to have recurrent atrial tachyarrhythmia.  We discussed different potential circuits and options for therapy. Regardless of strategy he will need long term thromboembolic prophylaxis (currently on Eliquis).  I suggested reevaluation of his substrate with an echocardiogram.  We will also do a 2 week monitor to better assess burden and rates of arrhythmia.  We did discuss that blood pressure control is important and can help mitigate risk for arrhythmia.  He will follow up with PMD for adjustment of his antihypertensive regimen.

## 2023-05-16 PROCEDURE — 93248 EXT ECG>7D<15D REV&INTERPJ: CPT

## 2023-05-17 ENCOUNTER — APPOINTMENT (OUTPATIENT)
Dept: CARDIOLOGY | Facility: CLINIC | Age: 73
End: 2023-05-17

## 2023-07-20 ENCOUNTER — OUTPATIENT (OUTPATIENT)
Dept: OUTPATIENT SERVICES | Facility: HOSPITAL | Age: 73
LOS: 1 days | End: 2023-07-20
Payer: MEDICARE

## 2023-07-20 VITALS
TEMPERATURE: 98 F | DIASTOLIC BLOOD PRESSURE: 78 MMHG | RESPIRATION RATE: 20 BRPM | HEART RATE: 67 BPM | SYSTOLIC BLOOD PRESSURE: 110 MMHG | HEIGHT: 66 IN | WEIGHT: 212.97 LBS | OXYGEN SATURATION: 98 %

## 2023-07-20 DIAGNOSIS — Z96.652 PRESENCE OF LEFT ARTIFICIAL KNEE JOINT: Chronic | ICD-10-CM

## 2023-07-20 DIAGNOSIS — I48.0 PAROXYSMAL ATRIAL FIBRILLATION: ICD-10-CM

## 2023-07-20 DIAGNOSIS — Z98.890 OTHER SPECIFIED POSTPROCEDURAL STATES: Chronic | ICD-10-CM

## 2023-07-20 DIAGNOSIS — I10 ESSENTIAL (PRIMARY) HYPERTENSION: ICD-10-CM

## 2023-07-20 DIAGNOSIS — I48.91 UNSPECIFIED ATRIAL FIBRILLATION: ICD-10-CM

## 2023-07-20 DIAGNOSIS — Z01.818 ENCOUNTER FOR OTHER PREPROCEDURAL EXAMINATION: ICD-10-CM

## 2023-07-20 LAB
ANION GAP SERPL CALC-SCNC: 14 MMOL/L — SIGNIFICANT CHANGE UP (ref 5–17)
BLD GP AB SCN SERPL QL: NEGATIVE — SIGNIFICANT CHANGE UP
BUN SERPL-MCNC: 17 MG/DL — SIGNIFICANT CHANGE UP (ref 7–23)
CALCIUM SERPL-MCNC: 9.8 MG/DL — SIGNIFICANT CHANGE UP (ref 8.4–10.5)
CHLORIDE SERPL-SCNC: 107 MMOL/L — SIGNIFICANT CHANGE UP (ref 96–108)
CO2 SERPL-SCNC: 21 MMOL/L — LOW (ref 22–31)
CREAT SERPL-MCNC: 1.78 MG/DL — HIGH (ref 0.5–1.3)
EGFR: 40 ML/MIN/1.73M2 — LOW
GLUCOSE SERPL-MCNC: 121 MG/DL — HIGH (ref 70–99)
HCT VFR BLD CALC: 43.8 % — SIGNIFICANT CHANGE UP (ref 39–50)
HGB BLD-MCNC: 15 G/DL — SIGNIFICANT CHANGE UP (ref 13–17)
MCHC RBC-ENTMCNC: 31.3 PG — SIGNIFICANT CHANGE UP (ref 27–34)
MCHC RBC-ENTMCNC: 34.2 GM/DL — SIGNIFICANT CHANGE UP (ref 32–36)
MCV RBC AUTO: 91.3 FL — SIGNIFICANT CHANGE UP (ref 80–100)
NRBC # BLD: 0 /100 WBCS — SIGNIFICANT CHANGE UP (ref 0–0)
PLATELET # BLD AUTO: 273 K/UL — SIGNIFICANT CHANGE UP (ref 150–400)
POTASSIUM SERPL-MCNC: 3.9 MMOL/L — SIGNIFICANT CHANGE UP (ref 3.5–5.3)
POTASSIUM SERPL-SCNC: 3.9 MMOL/L — SIGNIFICANT CHANGE UP (ref 3.5–5.3)
RBC # BLD: 4.8 M/UL — SIGNIFICANT CHANGE UP (ref 4.2–5.8)
RBC # FLD: 12.1 % — SIGNIFICANT CHANGE UP (ref 10.3–14.5)
RH IG SCN BLD-IMP: NEGATIVE — SIGNIFICANT CHANGE UP
SODIUM SERPL-SCNC: 142 MMOL/L — SIGNIFICANT CHANGE UP (ref 135–145)
WBC # BLD: 6.8 K/UL — SIGNIFICANT CHANGE UP (ref 3.8–10.5)
WBC # FLD AUTO: 6.8 K/UL — SIGNIFICANT CHANGE UP (ref 3.8–10.5)

## 2023-07-20 PROCEDURE — 86901 BLOOD TYPING SEROLOGIC RH(D): CPT

## 2023-07-20 PROCEDURE — G0463: CPT

## 2023-07-20 PROCEDURE — 85027 COMPLETE CBC AUTOMATED: CPT

## 2023-07-20 PROCEDURE — 86900 BLOOD TYPING SEROLOGIC ABO: CPT

## 2023-07-20 PROCEDURE — 86850 RBC ANTIBODY SCREEN: CPT

## 2023-07-20 PROCEDURE — 80048 BASIC METABOLIC PNL TOTAL CA: CPT

## 2023-07-20 PROCEDURE — 36415 COLL VENOUS BLD VENIPUNCTURE: CPT

## 2023-07-20 RX ORDER — ATENOLOL 25 MG/1
1 TABLET ORAL
Qty: 0 | Refills: 0 | DISCHARGE

## 2023-07-20 RX ORDER — METOPROLOL TARTRATE 50 MG
1 TABLET ORAL
Refills: 0 | DISCHARGE

## 2023-07-20 RX ORDER — ATORVASTATIN CALCIUM 80 MG/1
1 TABLET, FILM COATED ORAL
Qty: 0 | Refills: 0 | DISCHARGE

## 2023-07-20 RX ORDER — FINASTERIDE 5 MG/1
1 TABLET, FILM COATED ORAL
Refills: 0 | DISCHARGE

## 2023-07-20 RX ORDER — OMEPRAZOLE 10 MG/1
1 CAPSULE, DELAYED RELEASE ORAL
Qty: 0 | Refills: 0 | DISCHARGE

## 2023-07-20 RX ORDER — SUMATRIPTAN SUCCINATE 4 MG/.5ML
1 INJECTION, SOLUTION SUBCUTANEOUS
Qty: 0 | Refills: 0 | DISCHARGE

## 2023-07-20 RX ORDER — RIZATRIPTAN BENZOATE 5 MG/1
1 TABLET ORAL
Qty: 0 | Refills: 0 | DISCHARGE

## 2023-07-20 RX ORDER — APIXABAN 2.5 MG/1
1 TABLET, FILM COATED ORAL
Refills: 0 | DISCHARGE

## 2023-07-20 RX ORDER — ATORVASTATIN CALCIUM 80 MG/1
1 TABLET, FILM COATED ORAL
Refills: 0 | DISCHARGE

## 2023-07-20 RX ORDER — ERGOCALCIFEROL 1.25 MG/1
1 CAPSULE ORAL
Refills: 0 | DISCHARGE

## 2023-07-20 RX ORDER — RIZATRIPTAN BENZOATE 5 MG/1
1 TABLET ORAL
Refills: 0 | DISCHARGE

## 2023-07-20 RX ORDER — FAMOTIDINE 10 MG/ML
1 INJECTION INTRAVENOUS
Refills: 0 | DISCHARGE

## 2023-07-20 RX ORDER — TAMSULOSIN HYDROCHLORIDE 0.4 MG/1
1 CAPSULE ORAL
Refills: 0 | DISCHARGE

## 2023-07-20 RX ORDER — CHOLECALCIFEROL (VITAMIN D3) 125 MCG
1 CAPSULE ORAL
Qty: 0 | Refills: 0 | DISCHARGE

## 2023-07-20 RX ORDER — METHOTREXATE 2.5 MG/1
4 TABLET ORAL
Qty: 0 | Refills: 0 | DISCHARGE

## 2023-07-20 RX ORDER — SUMATRIPTAN SUCCINATE 4 MG/.5ML
1 INJECTION, SOLUTION SUBCUTANEOUS
Refills: 0 | DISCHARGE

## 2023-07-20 RX ORDER — FOLIC ACID 0.8 MG
1 TABLET ORAL
Qty: 0 | Refills: 0 | DISCHARGE

## 2023-07-20 RX ORDER — FINASTERIDE 5 MG/1
1 TABLET, FILM COATED ORAL
Qty: 0 | Refills: 0 | DISCHARGE

## 2023-07-20 RX ORDER — METHOTREXATE 2.5 MG/1
3 TABLET ORAL
Refills: 0 | DISCHARGE

## 2023-07-20 RX ORDER — TAMSULOSIN HYDROCHLORIDE 0.4 MG/1
1 CAPSULE ORAL
Qty: 0 | Refills: 0 | DISCHARGE

## 2023-07-20 NOTE — H&P PST ADULT - HISTORY OF PRESENT ILLNESS
73 yr old male with history of HTN , Rheumatoid arthritis ,  73 yr old male with history of HTN , Rheumatoid arthritis , Migraines , Atrial fibrillation - s/p ablation 2013- with recent episodes of atrial fibrillation since 3/2023 & was started on Eliquis. Now coming in for Ablation for atrial fibrillation on 8/2/2023.    Denies Recent travel, Exposure or Covid symptoms

## 2023-07-20 NOTE — H&P PST ADULT - NSICDXPASTSURGICALHX_GEN_ALL_CORE_FT
PAST SURGICAL HISTORY:  H/O elbow surgery     History of arthroscopy of knee left    S/P ablation of atrial fibrillation     S/P repair of ventral hernia     S/P total knee replacement, left     S/P trigger finger release

## 2023-07-20 NOTE — H&P PST ADULT - ASSESSMENT
Airway:  normal    Mallampati-       Dental: Patient denies loose teeth    Activity :  dasi mets :     Airway:  normal    Mallampati- 3      Dental: Patient denies loose teeth    Activity : walk , shopping , ADL   dasi mets : 5 DASI METS

## 2023-07-20 NOTE — H&P PST ADULT - NSICDXPASTMEDICALHX_GEN_ALL_CORE_FT
vertigo PAST MEDICAL HISTORY:  Afib S/P Ablation in 2013    BPH (benign prostatic hypertrophy)     Dyslipidemia     GERD (gastroesophageal reflux disease)     H/O rheumatoid arthritis     Hypertension     Migraine     Unilateral primary osteoarthritis, left knee      PAST MEDICAL HISTORY:  Afib S/P Ablation in 2013    BPH (benign prostatic hypertrophy)     Dyslipidemia     GERD (gastroesophageal reflux disease)     H/O rheumatoid arthritis     Hypertension     Migraine     Obesity     Unilateral primary osteoarthritis, left knee

## 2023-07-20 NOTE — H&P PST ADULT - NSICDXFAMILYHX_GEN_ALL_CORE_FT
FAMILY HISTORY:  Father  Still living? No  Family history of lung cancer, Age at diagnosis: Age Unknown    Mother  Still living? No  Family history of cancer, Age at diagnosis: Age Unknown    Sibling  Still living? No  Family history of pancreatic cancer, Age at diagnosis: Age Unknown

## 2023-07-26 ENCOUNTER — NON-APPOINTMENT (OUTPATIENT)
Age: 73
End: 2023-07-26

## 2023-08-02 ENCOUNTER — INPATIENT (INPATIENT)
Facility: HOSPITAL | Age: 73
LOS: 0 days | Discharge: HOME | DRG: 274 | End: 2023-08-03
Attending: INTERNAL MEDICINE | Admitting: INTERNAL MEDICINE
Payer: COMMERCIAL

## 2023-08-02 VITALS
TEMPERATURE: 98 F | DIASTOLIC BLOOD PRESSURE: 92 MMHG | HEART RATE: 55 BPM | OXYGEN SATURATION: 99 % | SYSTOLIC BLOOD PRESSURE: 140 MMHG | WEIGHT: 210.1 LBS | HEIGHT: 68 IN | RESPIRATION RATE: 17 BRPM

## 2023-08-02 DIAGNOSIS — Z98.890 OTHER SPECIFIED POSTPROCEDURAL STATES: Chronic | ICD-10-CM

## 2023-08-02 DIAGNOSIS — I48.0 PAROXYSMAL ATRIAL FIBRILLATION: ICD-10-CM

## 2023-08-02 DIAGNOSIS — Z96.652 PRESENCE OF LEFT ARTIFICIAL KNEE JOINT: Chronic | ICD-10-CM

## 2023-08-02 PROCEDURE — 93656 COMPRE EP EVAL ABLTJ ATR FIB: CPT

## 2023-08-02 PROCEDURE — 93010 ELECTROCARDIOGRAM REPORT: CPT

## 2023-08-02 PROCEDURE — 93655 ICAR CATH ABLTJ DSCRT ARRHYT: CPT

## 2023-08-02 PROCEDURE — 93623 PRGRMD STIMJ&PACG IV RX NFS: CPT | Mod: 26

## 2023-08-02 PROCEDURE — 93010 ELECTROCARDIOGRAM REPORT: CPT | Mod: 77

## 2023-08-02 RX ORDER — ASPIRIN/CALCIUM CARB/MAGNESIUM 324 MG
1 TABLET ORAL
Refills: 0 | DISCHARGE

## 2023-08-02 RX ORDER — FAMOTIDINE 10 MG/ML
20 INJECTION INTRAVENOUS DAILY
Refills: 0 | Status: DISCONTINUED | OUTPATIENT
Start: 2023-08-02 | End: 2023-08-03

## 2023-08-02 RX ORDER — TAMSULOSIN HYDROCHLORIDE 0.4 MG/1
0.4 CAPSULE ORAL AT BEDTIME
Refills: 0 | Status: DISCONTINUED | OUTPATIENT
Start: 2023-08-02 | End: 2023-08-03

## 2023-08-02 RX ORDER — APIXABAN 2.5 MG/1
5 TABLET, FILM COATED ORAL
Refills: 0 | Status: DISCONTINUED | OUTPATIENT
Start: 2023-08-02 | End: 2023-08-03

## 2023-08-02 RX ORDER — FINASTERIDE 5 MG/1
5 TABLET, FILM COATED ORAL DAILY
Refills: 0 | Status: DISCONTINUED | OUTPATIENT
Start: 2023-08-02 | End: 2023-08-03

## 2023-08-02 RX ORDER — METOPROLOL TARTRATE 50 MG
50 TABLET ORAL DAILY
Refills: 0 | Status: DISCONTINUED | OUTPATIENT
Start: 2023-08-02 | End: 2023-08-03

## 2023-08-02 RX ORDER — ASPIRIN/CALCIUM CARB/MAGNESIUM 324 MG
81 TABLET ORAL DAILY
Refills: 0 | Status: DISCONTINUED | OUTPATIENT
Start: 2023-08-02 | End: 2023-08-03

## 2023-08-02 RX ORDER — ATORVASTATIN CALCIUM 80 MG/1
20 TABLET, FILM COATED ORAL AT BEDTIME
Refills: 0 | Status: DISCONTINUED | OUTPATIENT
Start: 2023-08-02 | End: 2023-08-03

## 2023-08-02 RX ORDER — ACETAMINOPHEN 500 MG
650 TABLET ORAL EVERY 6 HOURS
Refills: 0 | Status: DISCONTINUED | OUTPATIENT
Start: 2023-08-02 | End: 2023-08-03

## 2023-08-02 RX ADMIN — TAMSULOSIN HYDROCHLORIDE 0.4 MILLIGRAM(S): 0.4 CAPSULE ORAL at 19:11

## 2023-08-02 RX ADMIN — ATORVASTATIN CALCIUM 20 MILLIGRAM(S): 80 TABLET, FILM COATED ORAL at 19:11

## 2023-08-02 RX ADMIN — APIXABAN 5 MILLIGRAM(S): 2.5 TABLET, FILM COATED ORAL at 18:53

## 2023-08-02 RX ADMIN — Medication 50 MILLIGRAM(S): at 19:11

## 2023-08-02 NOTE — CHART NOTE - NSCHARTNOTEFT_GEN_A_CORE
8/2  Received pt s/p successful AF ablation by Dr Charles  procedure under general anesthesia  Vascade x3 RFV, site benign  1.2 L fluid in total, pt voided   Resume Eliquis ASAP, last dose was last night  S/p 1g IV Tylenol given in the lab  EKG SR with 1st deg AV block  Too late to go home tonight, at midnight, 6hr post procedure, please discharge pt in am per Dr Charles

## 2023-08-02 NOTE — PRE-ANESTHESIA EVALUATION ADULT - NSANTHPMHFT_GEN_ALL_CORE
74 y/o M with pmhx atrial fibrillation (s/p ablation in 2013), HTN, HLD, rheumatoid arthritis, presenting for atrial fibrillation ablation.    Denies cp, sob, GERD-like symptoms.   Able to walk 2 blocks and 1 flight of stairs without difficulty.

## 2023-08-02 NOTE — PRE-ANESTHESIA EVALUATION ADULT - NSRADCARDRESULTSFT_GEN_ALL_CORE
TTE 5/10/2013  Conclusions:  1. Normal mitral valve. Mild mitral regurgitation.  2. Normal trileaflet aortic valve. No aortic valve  regurgitation seen.  3. Mildly dilated left atrium.  LA volume index = 31 cc/m2.  4. Normal left ventricular internal dimensions and wall  thickness.  5. Normal left ventricular systolic function. EF 65%. No  segmental wall motion abnormalities.  6. Right ventricular enlargement with normal right  ventricular systolic function.  7. Estimated right ventricular systolic pressure equals 61  mm Hg, assuming right atrial pressure equals 10 mm Hg,  consistent with severe pulmonary hypertension.  8. Mild-moderate tricuspid regurgitation.  9. Normal pericardium with no pericardial effusion.

## 2023-08-02 NOTE — PRE-ANESTHESIA EVALUATION ADULT - NSANTHPEFT_GEN_ALL_CORE
PHYSICAL EXAM:  GENERAL: NAD, well-developed, afebrile  CHEST/LUNG: Clear to auscultation bilaterally; breathing comfortably on room air   HEART: Regular rate and rhythm, no LE edema   NEUROLOGY: AAO*3, non-focal

## 2023-08-03 ENCOUNTER — TRANSCRIPTION ENCOUNTER (OUTPATIENT)
Age: 73
End: 2023-08-03

## 2023-08-03 VITALS
DIASTOLIC BLOOD PRESSURE: 86 MMHG | HEART RATE: 75 BPM | RESPIRATION RATE: 15 BRPM | SYSTOLIC BLOOD PRESSURE: 128 MMHG | OXYGEN SATURATION: 94 % | TEMPERATURE: 98 F

## 2023-08-03 LAB
ANION GAP SERPL CALC-SCNC: 15 MMOL/L — SIGNIFICANT CHANGE UP (ref 5–17)
BUN SERPL-MCNC: 14 MG/DL — SIGNIFICANT CHANGE UP (ref 7–23)
CALCIUM SERPL-MCNC: 9.4 MG/DL — SIGNIFICANT CHANGE UP (ref 8.4–10.5)
CHLORIDE SERPL-SCNC: 105 MMOL/L — SIGNIFICANT CHANGE UP (ref 96–108)
CO2 SERPL-SCNC: 19 MMOL/L — LOW (ref 22–31)
CREAT SERPL-MCNC: 1.66 MG/DL — HIGH (ref 0.5–1.3)
EGFR: 44 ML/MIN/1.73M2 — LOW
GLUCOSE SERPL-MCNC: 200 MG/DL — HIGH (ref 70–99)
HCT VFR BLD CALC: 44.4 % — SIGNIFICANT CHANGE UP (ref 39–50)
HGB BLD-MCNC: 15.1 G/DL — SIGNIFICANT CHANGE UP (ref 13–17)
MAGNESIUM SERPL-MCNC: 2.1 MG/DL — SIGNIFICANT CHANGE UP (ref 1.6–2.6)
MCHC RBC-ENTMCNC: 31.3 PG — SIGNIFICANT CHANGE UP (ref 27–34)
MCHC RBC-ENTMCNC: 34 GM/DL — SIGNIFICANT CHANGE UP (ref 32–36)
MCV RBC AUTO: 92.1 FL — SIGNIFICANT CHANGE UP (ref 80–100)
NRBC # BLD: 0 /100 WBCS — SIGNIFICANT CHANGE UP (ref 0–0)
PLATELET # BLD AUTO: 226 K/UL — SIGNIFICANT CHANGE UP (ref 150–400)
POTASSIUM SERPL-MCNC: 4.4 MMOL/L — SIGNIFICANT CHANGE UP (ref 3.5–5.3)
POTASSIUM SERPL-SCNC: 4.4 MMOL/L — SIGNIFICANT CHANGE UP (ref 3.5–5.3)
RBC # BLD: 4.82 M/UL — SIGNIFICANT CHANGE UP (ref 4.2–5.8)
RBC # FLD: 12.3 % — SIGNIFICANT CHANGE UP (ref 10.3–14.5)
SODIUM SERPL-SCNC: 139 MMOL/L — SIGNIFICANT CHANGE UP (ref 135–145)
WBC # BLD: 8.88 K/UL — SIGNIFICANT CHANGE UP (ref 3.8–10.5)
WBC # FLD AUTO: 8.88 K/UL — SIGNIFICANT CHANGE UP (ref 3.8–10.5)

## 2023-08-03 PROCEDURE — 85027 COMPLETE CBC AUTOMATED: CPT

## 2023-08-03 PROCEDURE — C1766: CPT

## 2023-08-03 PROCEDURE — C1769: CPT

## 2023-08-03 PROCEDURE — 93622 COMP EP EVAL L VENTR PAC&REC: CPT

## 2023-08-03 PROCEDURE — C1887: CPT

## 2023-08-03 PROCEDURE — 93656 COMPRE EP EVAL ABLTJ ATR FIB: CPT

## 2023-08-03 PROCEDURE — C1760: CPT

## 2023-08-03 PROCEDURE — 93355 ECHO TRANSESOPHAGEAL (TEE): CPT

## 2023-08-03 PROCEDURE — C1732: CPT

## 2023-08-03 PROCEDURE — 93657 TX L/R ATRIAL FIB ADDL: CPT

## 2023-08-03 PROCEDURE — C1759: CPT

## 2023-08-03 PROCEDURE — C1894: CPT

## 2023-08-03 PROCEDURE — 83735 ASSAY OF MAGNESIUM: CPT

## 2023-08-03 PROCEDURE — 99232 SBSQ HOSP IP/OBS MODERATE 35: CPT

## 2023-08-03 PROCEDURE — C1725: CPT

## 2023-08-03 PROCEDURE — 93613 INTRACARDIAC EPHYS 3D MAPG: CPT

## 2023-08-03 PROCEDURE — 93010 ELECTROCARDIOGRAM REPORT: CPT

## 2023-08-03 PROCEDURE — 93005 ELECTROCARDIOGRAM TRACING: CPT

## 2023-08-03 PROCEDURE — C1731: CPT

## 2023-08-03 PROCEDURE — 93655 ICAR CATH ABLTJ DSCRT ARRHYT: CPT

## 2023-08-03 PROCEDURE — 80048 BASIC METABOLIC PNL TOTAL CA: CPT

## 2023-08-03 PROCEDURE — 93623 PRGRMD STIMJ&PACG IV RX NFS: CPT

## 2023-08-03 RX ORDER — BENZOCAINE AND MENTHOL 5; 1 G/100ML; G/100ML
1 LIQUID ORAL
Qty: 0 | Refills: 0 | DISCHARGE
Start: 2023-08-03

## 2023-08-03 RX ORDER — ACETAMINOPHEN 500 MG
2 TABLET ORAL
Qty: 0 | Refills: 0 | DISCHARGE
Start: 2023-08-03

## 2023-08-03 RX ORDER — BENZOCAINE AND MENTHOL 5; 1 G/100ML; G/100ML
1 LIQUID ORAL EVERY 4 HOURS
Refills: 0 | Status: DISCONTINUED | OUTPATIENT
Start: 2023-08-03 | End: 2023-08-03

## 2023-08-03 RX ORDER — SIMETHICONE 80 MG/1
80 TABLET, CHEWABLE ORAL EVERY 4 HOURS
Refills: 0 | Status: DISCONTINUED | OUTPATIENT
Start: 2023-08-03 | End: 2023-08-03

## 2023-08-03 RX ORDER — SIMETHICONE 80 MG/1
1 TABLET, CHEWABLE ORAL
Qty: 0 | Refills: 0 | DISCHARGE
Start: 2023-08-03

## 2023-08-03 RX ADMIN — Medication 30 MILLILITER(S): at 00:54

## 2023-08-03 RX ADMIN — APIXABAN 5 MILLIGRAM(S): 2.5 TABLET, FILM COATED ORAL at 06:13

## 2023-08-03 RX ADMIN — Medication 81 MILLIGRAM(S): at 06:13

## 2023-08-03 NOTE — DISCHARGE NOTE PROVIDER - NSDCFUSCHEDAPPT_GEN_ALL_CORE_FT
Davion Charles  [facility]  ELECTROPH [address]  Scheduled Appointment: 09/19/2023

## 2023-08-03 NOTE — DISCHARGE NOTE NURSING/CASE MANAGEMENT/SOCIAL WORK - PATIENT PORTAL LINK FT
You can access the FollowMyHealth Patient Portal offered by [facility] by registering at the following website: [email or web address]. By joining [facility]’s FollowMyHealth portal, you will also be able to view your health information using other applications (apps) compatible with our system.

## 2023-08-03 NOTE — DISCHARGE NOTE PROVIDER - NSDCCPCAREPLAN_GEN_ALL_CORE_FT
PRINCIPAL DISCHARGE DIAGNOSIS  Diagnosis: Atrial fibrillation  Assessment and Plan of Treatment: Continue with your cardiologist and primary care MD. Continue your current medications. Call your physician for palpitations, feelings of rapid heart beat, lightheadedness, or dizziness. Continue Eliquis as prescribed.      SECONDARY DISCHARGE DIAGNOSES  Diagnosis: Hypertension  Assessment and Plan of Treatment: Continue with your blood pressure medications; eat a heart healthy diet with low salt diet; exercise regularly (consult with your physician or cardiologist first); maintain a heart healthy weight; if you smoke - quit (A resource to help you stop smoking is the [facility] [employer] for Tobacco Control – phone number [phone].); include healthy ways to manage stress. Continue to follow with your primary care physician or cardiologist.    Diagnosis: HLD (hyperlipidemia)  Assessment and Plan of Treatment: Continue with your cholesterol medications. Eat a heart healthy diet that is low in saturated fats and salt, and includes whole grains, fruits, vegetables and lean protein; exercise regularly (consult with your physician or cardiologist first); maintain a heart healthy weight; if you smoke - quit (A resource to help you stop smoking is the [facility] [employer] for Tobacco Control – phone number [phone].). Continue to follow with your primary physician or cardiologist.

## 2023-08-03 NOTE — DISCHARGE NOTE PROVIDER - HOSPITAL COURSE
HPI:  73 yr old male with history of HTN , Rheumatoid arthritis , Migraines , Atrial fibrillation - s/p ablation 2013- with recent episodes of atrial fibrillation since 3/2023 & was started on Eliquis. Now coming in for Ablation for atrial fibrillation on 8/2/2023.    Denies Recent travel, Exposure or Covid symptoms   (20 Jul 2023 15:18)    8/2 s/p atrial fib ablation. Right femoral vein site without swelling, bleeding.   HPI:  73 yr old male with history of HTN , Rheumatoid arthritis , Migraines , Atrial fibrillation - s/p ablation 2013- with recent episodes of atrial fibrillation since 3/2023 & was started on Eliquis. Now coming in for Ablation for atrial fibrillation on 8/2/2023.    8/2 s/p atrial fib ablation. Right femoral vein site without swelling, bleeding.

## 2023-08-03 NOTE — DISCHARGE NOTE PROVIDER - CARE PROVIDER_API CALL
Davion Charles.  Cardiac Electrophysiology  [address] [place] [id number]  Phone: [phone]  Fax: [phone]  Scheduled Appointment: 09/19/2023   Davion Charles.  Cardiac Electrophysiology  [address] [place] [id number]  Phone: [phone]  Fax: [phone]  Established Patient  Scheduled Appointment: 09/19/2023 02:30 PM

## 2023-08-03 NOTE — PROGRESS NOTE ADULT - ASSESSMENT
Chief Complaint   Patient presents with     New Patient     New appt      Vitals were taken and medications were reconciled.     Sue Dean RMA  12:52 PM     72 y/o male with above pmhx, s/p atrial fib ablation on 8/2.

## 2023-08-03 NOTE — PROGRESS NOTE ADULT - SUBJECTIVE AND OBJECTIVE BOX
HPI:  73 yr old male with history of HTN , Rheumatoid arthritis , Migraines , Atrial fibrillation - s/p ablation 2013- with recent episodes of atrial fibrillation since 3/2023 & was started on Eliquis. Now coming in for Ablation for atrial fibrillation on 8/2/2023.    Denies Recent travel, Exposure or Covid symptoms   (20 Jul 2023 15:18)    Patient is a 73y old  Male who presents with a chief complaint of atrial fib ablation (20 Jul 2023 15:18)          Allergies    No Known Allergies    Intolerances        Medications:  acetaminophen     Tablet .. 650 milliGRAM(s) Oral every 6 hours PRN  aluminum hydroxide/magnesium hydroxide/simethicone Suspension 30 milliLiter(s) Oral every 4 hours PRN  apixaban 5 milliGRAM(s) Oral two times a day  aspirin enteric coated 81 milliGRAM(s) Oral daily  atorvastatin 20 milliGRAM(s) Oral at bedtime  benzocaine/menthol Lozenge 1 Lozenge Oral every 4 hours PRN  famotidine    Tablet 20 milliGRAM(s) Oral daily  finasteride 5 milliGRAM(s) Oral daily  metoprolol succinate ER 50 milliGRAM(s) Oral daily  simethicone 80 milliGRAM(s) Chew every 4 hours PRN  tamsulosin 0.4 milliGRAM(s) Oral at bedtime      Vitals:  T(C): 36.4 (08-03-23 @ 01:00), Max: 36.9 (08-02-23 @ 10:36)  HR: 81 (08-03-23 @ 01:00) (55 - 102)  BP: 143/94 (08-03-23 @ 01:00) (137/96 - 178/97)  BP(mean): 108 (08-03-23 @ 01:00) (108 - 124)  RR: 16 (08-03-23 @ 01:00) (10 - 29)  SpO2: 95% (08-03-23 @ 01:00) (89% - 99%)  Wt(kg): --  Daily Height in cm: 172.72 (02 Aug 2023 12:57)    Daily   I&O's Summary    02 Aug 2023 07:01  -  03 Aug 2023 04:48  --------------------------------------------------------  IN: 240 mL / OUT: 1000 mL / NET: -760 mL          Physical Exam:  Appearance: Normal  Eyes: PERRL, EOMI  HENT: Normal oral mus[place]a, NC/AT  Cardiovascular: S1S2, RRR, No M/R/G, no JVD, No Lower extremity edema  Procedural Access Site: No hematoma, Non-tender to palpation, 2+ pulse, No bruit, No Ecchymosis  Respiratory: Clear to auscultation bilaterally  Gastrointestinal: Soft, Non tender, Normal Bowel Sounds  Musculoskeletal: No clubbing, No joint deformity   Neurologic: Non-focal  Lymphatic: No lymphadenopathy  Psychiatry: AAOx3, Mood & affect appropriate  Skin: No rashes, No ecchymoses, No cyanosis    08-03    139  |  105  |  14  ----------------------------<  200<H>  4.4   |  19<L>  |  1.66<H>    Ca    9.4      03 Aug 2023 01:50  Mg     2.1     08-03              Lipid panel   Hgb A1c                         15.1   8.88  )-----------( 226      ( 03 Aug 2023 01:50 )             44.4         ECG: SR 1st degree 87 bpm

## 2023-08-03 NOTE — DISCHARGE NOTE PROVIDER - NSDCMRMEDTOKEN_GEN_ALL_CORE_FT
acetaminophen 325 mg oral tablet: 2 tab(s) orally every 6 hours As needed Mild Pain (1 - 3)  aluminum hydroxide-magnesium hydroxide 200 mg-200 mg/5 mL oral suspension: 30 milliliter(s) orally every 4 hours As needed Dyspepsia  aspirin 81 mg oral tablet: 1 tab(s) orally once a day  atorvastatin 20 mg oral tablet: 1 tab(s) orally once a day (at bedtime)  Eliquis 5 mg oral tablet: 1 tab(s) orally 2 times a day  ergocalciferol 50 mcg (2000 intl units) oral capsule: 1 cap(s) orally once a day  famotidine 40 mg oral tablet: 1 tab(s) orally once a day  finasteride 5 mg oral tablet: 1 tab(s) orally once a day  menthol-benzocaine topical: 1 lozenge sublingual every 4 hours while awake as needed for  sore throat  methotrexate 2.5 mg oral tablet: 3 tab(s) orally once a week monday  metoprolol succinate 50 mg oral capsule, extended release: 1 cap(s) orally once a day  rizatriptan 10 mg oral tablet: 1 tab(s) orally once a day as needed for  headache  simethicone 80 mg oral tablet, chewable: 1 tab(s) orally every 4 hours As needed Gas  SUMAtriptan 100 mg oral tablet: 1 tab(s) orally once a day as needed for  headache  tamsulosin 0.4 mg oral capsule: 1 cap(s) orally once a day

## 2023-08-03 NOTE — DISCHARGE NOTE PROVIDER - PROVIDER TOKENS
PROVIDER:[TOKEN:[[id number]],SCHEDULEDAPPT:[09/19/2023]] PROVIDER:[TOKEN:[[id number]],SCHEDULEDAPPT:[09/19/2023],SCHEDULEDAPPTTIME:[02:30 PM],ESTABLISHEDPATIENT:[T]]

## 2023-08-03 NOTE — DISCHARGE NOTE NURSING/CASE MANAGEMENT/SOCIAL WORK - NSDCPEFALRISK_GEN_ALL_CORE
For information on Fall & Injury Prevention, visit: [email or web address] OR  [email or web address] OR  https://www.cdc.gov/steadi/patient.html

## 2023-09-19 ENCOUNTER — NON-APPOINTMENT (OUTPATIENT)
Age: 73
End: 2023-09-19

## 2023-09-19 ENCOUNTER — APPOINTMENT (OUTPATIENT)
Dept: ELECTROPHYSIOLOGY | Facility: CLINIC | Age: 73
End: 2023-09-19
Payer: MEDICARE

## 2023-09-19 VITALS
HEIGHT: 66 IN | WEIGHT: 210 LBS | DIASTOLIC BLOOD PRESSURE: 91 MMHG | SYSTOLIC BLOOD PRESSURE: 153 MMHG | HEART RATE: 56 BPM | BODY MASS INDEX: 33.75 KG/M2 | OXYGEN SATURATION: 96 %

## 2023-09-19 PROBLEM — E66.9 OBESITY, UNSPECIFIED: Chronic | Status: ACTIVE | Noted: 2023-07-20

## 2023-09-19 PROBLEM — Z87.39 PERSONAL HISTORY OF OTHER DISEASES OF THE MUSCULOSKELETAL SYSTEM AND CONNECTIVE TISSUE: Chronic | Status: ACTIVE | Noted: 2023-07-20

## 2023-09-19 PROBLEM — K21.9 GASTRO-ESOPHAGEAL REFLUX DISEASE WITHOUT ESOPHAGITIS: Chronic | Status: ACTIVE | Noted: 2023-07-20

## 2023-09-19 PROCEDURE — 93000 ELECTROCARDIOGRAM COMPLETE: CPT

## 2023-09-19 PROCEDURE — 99214 OFFICE O/P EST MOD 30 MIN: CPT

## 2023-09-19 RX ORDER — METHOTREXATE 2.5 MG/1
2.5 TABLET ORAL
Refills: 0 | Status: DISCONTINUED | COMMUNITY
Start: 2023-04-25 | End: 2023-09-19

## 2024-02-06 ENCOUNTER — APPOINTMENT (OUTPATIENT)
Dept: ELECTROPHYSIOLOGY | Facility: CLINIC | Age: 74
End: 2024-02-06
Payer: MEDICARE

## 2024-02-06 ENCOUNTER — NON-APPOINTMENT (OUTPATIENT)
Age: 74
End: 2024-02-06

## 2024-02-06 VITALS
BODY MASS INDEX: 33.75 KG/M2 | RESPIRATION RATE: 14 BRPM | HEIGHT: 66 IN | WEIGHT: 210 LBS | DIASTOLIC BLOOD PRESSURE: 92 MMHG | HEART RATE: 55 BPM | OXYGEN SATURATION: 95 % | SYSTOLIC BLOOD PRESSURE: 152 MMHG

## 2024-02-06 DIAGNOSIS — I10 ESSENTIAL (PRIMARY) HYPERTENSION: ICD-10-CM

## 2024-02-06 DIAGNOSIS — I48.91 UNSPECIFIED ATRIAL FIBRILLATION: ICD-10-CM

## 2024-02-06 PROCEDURE — 99215 OFFICE O/P EST HI 40 MIN: CPT

## 2024-02-06 PROCEDURE — 93000 ELECTROCARDIOGRAM COMPLETE: CPT

## 2024-02-06 NOTE — HISTORY OF PRESENT ILLNESS
[FreeTextEntry1] : 73 year old man status post ablation for persistent atrial fibrillation (PVI, CTI and LA roof ablation in 2013).  On 8/2/2023 he underwent repeat ablation for recurrent arrhythmia.  At this procedure he had reconnection of the caval tricuspid isthmus with successful block after RF, reisolaiton of the PVs , reablation of the LA roof and ablation of atrial tachycardia from the inferior RA.  No shortness of breath. No chest pain. NO shortness of breath.

## 2024-02-06 NOTE — CARDIOLOGY SUMMARY
[___] : [unfilled] [de-identified] : today: SR with APC 2/23/2023: atrial flutter with variable block [de-identified] : 8/2/2023 Reconnection of the caval tricuspid isthmus with successful block after RF Reisolaiton of the PVs  Reablation of the LA roof Ablation of atrial tachycardia from the inferior RA

## 2024-03-11 ENCOUNTER — RX RENEWAL (OUTPATIENT)
Age: 74
End: 2024-03-11

## 2024-03-11 RX ORDER — LOSARTAN POTASSIUM 50 MG/1
50 TABLET, FILM COATED ORAL DAILY
Qty: 30 | Refills: 0 | Status: ACTIVE | COMMUNITY
Start: 2024-02-06 | End: 1900-01-01

## 2024-05-16 NOTE — H&P PST ADULT - ENMT
Medication Refill Request     Name of Medication  ALPRAZolam ER (XANAX XR) 2 MG 24 hr tablet   Dose/Frequency      Take 1 tablet (2 mg total) by mouth 2 (two) times a day before breakfast and lunch     Quantity  60  Verified pharmacy   [x]  Verified ordering Provider   [x]  Does patient have enough for the next 3 days? Yes [] No [x]  Does patient have a follow-up appointment scheduled? Yes [x] No []   If so when is appointment:  05/28/24 @ 1 pm      Medication Refill Request     Name of Medication  traZODone (DESYREL) 100 mg tablet   Dose/Frequency  Take 1 tablet (100 mg total) by mouth daily at bedtime   Quantity  90  Verified pharmacy   [x]  Verified ordering Provider   [x]  Does patient have enough for the next 3 days? Yes [] No [x]  Does patient have a follow-up appointment scheduled? Yes [x] No []   If so when is appointment:  05/28/24 @ 1 pm   details… detailed exam mouth/pharynx

## 2024-08-16 NOTE — PATIENT PROFILE ADULT. - NS TRANSFER EYEGLASSES PAIRS
Thank you for coming to [facility].  It has been my pleasure to take care of you!      -Please follow-up with your primary care provider in 3 to 5 days with labs.  
1 pair

## 2024-11-19 NOTE — DISCHARGE NOTE ADULT - ADDITIONAL INSTRUCTIONS
Follow up with Dr. Figueroa as outpatient in 10-14 days after discharge from the hospital or rehab. Call office for appointment [phone].
No indicators present

## 2025-05-28 NOTE — DISCUSSION/SUMMARY
Patient messaged for prior authorization on:    Medication: hydrocortisone GEL  Dosage: 2%  Quantity requested:  28 grams  Pharmacy for prescription has been selected.  Add Key or PA number if applicable n/a  Initiation of prior authorization needed.     [FreeTextEntry1] : 73 year old man status post ablation for persistent atrial fibrillation (PVI, CTI and LA roof ablation in 2013).  On 8/2/2023 he underwent repeat ablation for recurrent arrhythmia.  At this procedure he had reconnection of the caval tricuspid isthmus with successful block after RF, reisolaiton of the PVs , reablation of the LA roof and ablation of atrial tachycardia from the inferior RA.  We discussed that blood pressure control is important in minimizing recurrent arrhythmia.  TO that end we will add Cozaar.  He will follow up with cardiology (referred to Dr Shirley) for follow up care.